# Patient Record
Sex: MALE | Race: WHITE | URBAN - METROPOLITAN AREA
[De-identification: names, ages, dates, MRNs, and addresses within clinical notes are randomized per-mention and may not be internally consistent; named-entity substitution may affect disease eponyms.]

---

## 2018-09-22 ENCOUNTER — ED HISTORICAL/CONVERTED ENCOUNTER (OUTPATIENT)
Dept: OTHER | Age: 43
End: 2018-09-22

## 2020-11-12 ENCOUNTER — APPOINTMENT (OUTPATIENT)
Dept: GENERAL RADIOLOGY | Age: 45
DRG: 871 | End: 2020-11-12
Attending: EMERGENCY MEDICINE
Payer: COMMERCIAL

## 2020-11-12 ENCOUNTER — HOSPITAL ENCOUNTER (INPATIENT)
Age: 45
LOS: 7 days | Discharge: HOME | DRG: 871 | End: 2020-11-20
Attending: EMERGENCY MEDICINE | Admitting: INTERNAL MEDICINE
Payer: COMMERCIAL

## 2020-11-12 ENCOUNTER — APPOINTMENT (OUTPATIENT)
Dept: CT IMAGING | Age: 45
DRG: 871 | End: 2020-11-12
Attending: EMERGENCY MEDICINE
Payer: COMMERCIAL

## 2020-11-12 DIAGNOSIS — U07.1 PNEUMONIA DUE TO COVID-19 VIRUS: Primary | ICD-10-CM

## 2020-11-12 DIAGNOSIS — R19.7 DIARRHEA, UNSPECIFIED TYPE: ICD-10-CM

## 2020-11-12 DIAGNOSIS — R65.10 SIRS (SYSTEMIC INFLAMMATORY RESPONSE SYNDROME) (HCC): ICD-10-CM

## 2020-11-12 DIAGNOSIS — A41.9 SEPSIS WITH ACUTE ORGAN DYSFUNCTION, DUE TO UNSPECIFIED ORGANISM, UNSPECIFIED TYPE, UNSPECIFIED WHETHER SEPTIC SHOCK PRESENT (HCC): ICD-10-CM

## 2020-11-12 DIAGNOSIS — R65.20 SEPSIS WITH ACUTE ORGAN DYSFUNCTION, DUE TO UNSPECIFIED ORGANISM, UNSPECIFIED TYPE, UNSPECIFIED WHETHER SEPTIC SHOCK PRESENT (HCC): ICD-10-CM

## 2020-11-12 DIAGNOSIS — R07.9 CHEST PAIN, UNSPECIFIED TYPE: ICD-10-CM

## 2020-11-12 DIAGNOSIS — K22.89 ESOPHAGEAL MASS: ICD-10-CM

## 2020-11-12 DIAGNOSIS — J12.82 PNEUMONIA DUE TO COVID-19 VIRUS: Primary | ICD-10-CM

## 2020-11-12 LAB
ALBUMIN SERPL-MCNC: 2.7 G/DL (ref 3.5–5)
ALBUMIN/GLOB SERPL: 0.6 {RATIO} (ref 1.1–2.2)
ALP SERPL-CCNC: 111 U/L (ref 45–117)
ALT SERPL-CCNC: 25 U/L (ref 12–78)
ANION GAP SERPL CALC-SCNC: 13 MMOL/L (ref 5–15)
AST SERPL W P-5'-P-CCNC: 28 U/L (ref 15–37)
BASOPHILS # BLD: 0 K/UL (ref 0–0.1)
BASOPHILS NFR BLD: 0 % (ref 0–1)
BILIRUB SERPL-MCNC: 0.5 MG/DL (ref 0.2–1)
BUN SERPL-MCNC: 22 MG/DL (ref 6–20)
BUN/CREAT SERPL: 15 (ref 12–20)
CA-I BLD-MCNC: 8.7 MG/DL (ref 8.5–10.1)
CHLORIDE SERPL-SCNC: 95 MMOL/L (ref 97–108)
CO2 SERPL-SCNC: 23 MMOL/L (ref 21–32)
CREAT SERPL-MCNC: 1.48 MG/DL (ref 0.7–1.3)
DIFFERENTIAL METHOD BLD: ABNORMAL
EOSINOPHIL # BLD: 0 K/UL (ref 0–0.4)
EOSINOPHIL NFR BLD: 0 % (ref 0–7)
ERYTHROCYTE [DISTWIDTH] IN BLOOD BY AUTOMATED COUNT: 13.3 % (ref 11.5–14.5)
GLOBULIN SER CALC-MCNC: 4.9 G/DL (ref 2–4)
GLUCOSE SERPL-MCNC: 338 MG/DL (ref 65–100)
HCT VFR BLD AUTO: 46.5 % (ref 36.6–50.3)
HGB BLD-MCNC: 16.2 G/DL (ref 12.1–17)
IMM GRANULOCYTES # BLD AUTO: 0.5 K/UL (ref 0–0.04)
IMM GRANULOCYTES NFR BLD AUTO: 2 % (ref 0–0.5)
LACTATE SERPL-SCNC: 6.3 MMOL/L (ref 0.4–2)
LYMPHOCYTES # BLD: 1.3 K/UL (ref 0.8–3.5)
LYMPHOCYTES NFR BLD: 5 % (ref 12–49)
MCH RBC QN AUTO: 31 PG (ref 26–34)
MCHC RBC AUTO-ENTMCNC: 34.8 G/DL (ref 30–36.5)
MCV RBC AUTO: 89.1 FL (ref 80–99)
MONOCYTES # BLD: 1.1 K/UL (ref 0–1)
MONOCYTES NFR BLD: 4 % (ref 5–13)
NEUTS SEG # BLD: 26.1 K/UL (ref 1.8–8)
NEUTS SEG NFR BLD: 92 % (ref 32–75)
PLATELET # BLD AUTO: 112 K/UL (ref 150–400)
PMV BLD AUTO: 11.3 FL (ref 8.9–12.9)
POTASSIUM SERPL-SCNC: 3.4 MMOL/L (ref 3.5–5.1)
PROT SERPL-MCNC: 7.6 G/DL (ref 6.4–8.2)
RBC # BLD AUTO: 5.22 M/UL (ref 4.1–5.7)
SODIUM SERPL-SCNC: 131 MMOL/L (ref 136–145)
TROPONIN I SERPL-MCNC: <0.05 NG/ML
WBC # BLD AUTO: 28.4 K/UL (ref 4.1–11.1)

## 2020-11-12 PROCEDURE — 71250 CT THORAX DX C-: CPT

## 2020-11-12 PROCEDURE — 80053 COMPREHEN METABOLIC PANEL: CPT

## 2020-11-12 PROCEDURE — 83605 ASSAY OF LACTIC ACID: CPT

## 2020-11-12 PROCEDURE — 36415 COLL VENOUS BLD VENIPUNCTURE: CPT

## 2020-11-12 PROCEDURE — 74011250636 HC RX REV CODE- 250/636: Performed by: EMERGENCY MEDICINE

## 2020-11-12 PROCEDURE — 85025 COMPLETE CBC W/AUTO DIFF WBC: CPT

## 2020-11-12 PROCEDURE — 71045 X-RAY EXAM CHEST 1 VIEW: CPT

## 2020-11-12 PROCEDURE — 96361 HYDRATE IV INFUSION ADD-ON: CPT

## 2020-11-12 PROCEDURE — 93005 ELECTROCARDIOGRAM TRACING: CPT

## 2020-11-12 PROCEDURE — 99285 EMERGENCY DEPT VISIT HI MDM: CPT

## 2020-11-12 PROCEDURE — 87040 BLOOD CULTURE FOR BACTERIA: CPT

## 2020-11-12 PROCEDURE — 84484 ASSAY OF TROPONIN QUANT: CPT

## 2020-11-12 RX ORDER — SODIUM CHLORIDE 9 MG/ML
125 INJECTION, SOLUTION INTRAVENOUS CONTINUOUS
Status: DISCONTINUED | OUTPATIENT
Start: 2020-11-13 | End: 2020-11-17

## 2020-11-12 RX ORDER — AZITHROMYCIN 500 MG/1
500 TABLET, FILM COATED ORAL DAILY
Status: DISCONTINUED | OUTPATIENT
Start: 2020-11-13 | End: 2020-11-19

## 2020-11-12 RX ORDER — DEXAMETHASONE SODIUM PHOSPHATE 4 MG/ML
4 INJECTION, SOLUTION INTRA-ARTICULAR; INTRALESIONAL; INTRAMUSCULAR; INTRAVENOUS; SOFT TISSUE EVERY 6 HOURS
Status: DISCONTINUED | OUTPATIENT
Start: 2020-11-13 | End: 2020-11-13

## 2020-11-12 RX ADMIN — SODIUM CHLORIDE 1000 ML: 9 INJECTION, SOLUTION INTRAVENOUS at 23:07

## 2020-11-12 RX ADMIN — SODIUM CHLORIDE 1000 ML: 9 INJECTION, SOLUTION INTRAVENOUS at 22:11

## 2020-11-13 PROBLEM — J12.82 PNEUMONIA DUE TO COVID-19 VIRUS: Status: ACTIVE | Noted: 2020-11-13

## 2020-11-13 PROBLEM — U07.1 PNEUMONIA DUE TO COVID-19 VIRUS: Status: ACTIVE | Noted: 2020-11-13

## 2020-11-13 PROBLEM — A41.9 SEPSIS (HCC): Status: ACTIVE | Noted: 2020-11-13

## 2020-11-13 LAB
ATRIAL RATE: 129 BPM
CALCULATED P AXIS, ECG09: 46 DEGREES
CALCULATED R AXIS, ECG10: 28 DEGREES
CALCULATED T AXIS, ECG11: 47 DEGREES
DIAGNOSIS, 93000: NORMAL
GLUCOSE BLD STRIP.AUTO-MCNC: 342 MG/DL (ref 65–100)
GLUCOSE BLD STRIP.AUTO-MCNC: 358 MG/DL (ref 65–100)
GLUCOSE BLD STRIP.AUTO-MCNC: 368 MG/DL (ref 65–100)
LACTATE SERPL-SCNC: 2.8 MMOL/L (ref 0.4–2)
P-R INTERVAL, ECG05: 138 MS
PERFORMED BY, TECHID: ABNORMAL
Q-T INTERVAL, ECG07: 286 MS
QRS DURATION, ECG06: 82 MS
QTC CALCULATION (BEZET), ECG08: 418 MS
VENTRICULAR RATE, ECG03: 129 BPM

## 2020-11-13 PROCEDURE — 74011000258 HC RX REV CODE- 258: Performed by: INTERNAL MEDICINE

## 2020-11-13 PROCEDURE — 74011250636 HC RX REV CODE- 250/636: Performed by: EMERGENCY MEDICINE

## 2020-11-13 PROCEDURE — 74011250637 HC RX REV CODE- 250/637: Performed by: INTERNAL MEDICINE

## 2020-11-13 PROCEDURE — 87449 NOS EACH ORGANISM AG IA: CPT

## 2020-11-13 PROCEDURE — 83605 ASSAY OF LACTIC ACID: CPT

## 2020-11-13 PROCEDURE — 99221 1ST HOSP IP/OBS SF/LOW 40: CPT | Performed by: INTERNAL MEDICINE

## 2020-11-13 PROCEDURE — 74011000250 HC RX REV CODE- 250: Performed by: INTERNAL MEDICINE

## 2020-11-13 PROCEDURE — 87070 CULTURE OTHR SPECIMN AEROBIC: CPT

## 2020-11-13 PROCEDURE — 96374 THER/PROPH/DIAG INJ IV PUSH: CPT

## 2020-11-13 PROCEDURE — 74011250636 HC RX REV CODE- 250/636: Performed by: INTERNAL MEDICINE

## 2020-11-13 PROCEDURE — XW033E5 INTRODUCTION OF REMDESIVIR ANTI-INFECTIVE INTO PERIPHERAL VEIN, PERCUTANEOUS APPROACH, NEW TECHNOLOGY GROUP 5: ICD-10-PCS | Performed by: INTERNAL MEDICINE

## 2020-11-13 PROCEDURE — 36415 COLL VENOUS BLD VENIPUNCTURE: CPT

## 2020-11-13 PROCEDURE — 74011250637 HC RX REV CODE- 250/637: Performed by: EMERGENCY MEDICINE

## 2020-11-13 PROCEDURE — 74011636637 HC RX REV CODE- 636/637: Performed by: INTERNAL MEDICINE

## 2020-11-13 PROCEDURE — 82962 GLUCOSE BLOOD TEST: CPT

## 2020-11-13 PROCEDURE — 65270000029 HC RM PRIVATE

## 2020-11-13 RX ORDER — GUAIFENESIN/DEXTROMETHORPHAN 100-10MG/5
5 SYRUP ORAL
Status: DISCONTINUED | OUTPATIENT
Start: 2020-11-13 | End: 2020-11-20 | Stop reason: HOSPADM

## 2020-11-13 RX ORDER — ROSUVASTATIN CALCIUM 10 MG/1
1 TABLET, COATED ORAL DAILY
COMMUNITY
Start: 2019-01-15 | End: 2020-11-20

## 2020-11-13 RX ORDER — INSULIN GLARGINE 100 [IU]/ML
10 INJECTION, SOLUTION SUBCUTANEOUS DAILY
Status: DISCONTINUED | OUTPATIENT
Start: 2020-11-13 | End: 2020-11-13

## 2020-11-13 RX ORDER — ACETAMINOPHEN 650 MG/1
650 SUPPOSITORY RECTAL
Status: DISCONTINUED | OUTPATIENT
Start: 2020-11-13 | End: 2020-11-20 | Stop reason: HOSPADM

## 2020-11-13 RX ORDER — BISACODYL 5 MG
5 TABLET, DELAYED RELEASE (ENTERIC COATED) ORAL DAILY PRN
Status: DISCONTINUED | OUTPATIENT
Start: 2020-11-13 | End: 2020-11-20 | Stop reason: HOSPADM

## 2020-11-13 RX ORDER — MAGNESIUM SULFATE 100 %
4 CRYSTALS MISCELLANEOUS AS NEEDED
Status: DISCONTINUED | OUTPATIENT
Start: 2020-11-13 | End: 2020-11-17

## 2020-11-13 RX ORDER — ACETAMINOPHEN 325 MG/1
650 TABLET ORAL
Status: DISCONTINUED | OUTPATIENT
Start: 2020-11-13 | End: 2020-11-20 | Stop reason: HOSPADM

## 2020-11-13 RX ORDER — PROMETHAZINE HYDROCHLORIDE AND DEXTROMETHORPHAN HYDROBROMIDE 6.25; 15 MG/5ML; MG/5ML
5 SYRUP ORAL 3 TIMES DAILY
COMMUNITY
Start: 2020-11-11 | End: 2020-11-20

## 2020-11-13 RX ORDER — BENZONATATE 100 MG/1
100 CAPSULE ORAL
Status: DISCONTINUED | OUTPATIENT
Start: 2020-11-13 | End: 2020-11-20 | Stop reason: HOSPADM

## 2020-11-13 RX ORDER — INSULIN LISPRO 100 [IU]/ML
INJECTION, SOLUTION INTRAVENOUS; SUBCUTANEOUS
Status: DISCONTINUED | OUTPATIENT
Start: 2020-11-13 | End: 2020-11-15

## 2020-11-13 RX ORDER — DEXTROSE 50 % IN WATER (D50W) INTRAVENOUS SYRINGE
25-50 AS NEEDED
Status: DISCONTINUED | OUTPATIENT
Start: 2020-11-13 | End: 2020-11-17

## 2020-11-13 RX ORDER — DEXAMETHASONE SODIUM PHOSPHATE 4 MG/ML
6 INJECTION, SOLUTION INTRA-ARTICULAR; INTRALESIONAL; INTRAMUSCULAR; INTRAVENOUS; SOFT TISSUE EVERY 6 HOURS
Status: DISCONTINUED | OUTPATIENT
Start: 2020-11-13 | End: 2020-11-19

## 2020-11-13 RX ORDER — SODIUM CHLORIDE 0.9 % (FLUSH) 0.9 %
5-40 SYRINGE (ML) INJECTION AS NEEDED
Status: DISCONTINUED | OUTPATIENT
Start: 2020-11-13 | End: 2020-11-20 | Stop reason: HOSPADM

## 2020-11-13 RX ORDER — ALBUTEROL SULFATE 90 UG/1
2 AEROSOL, METERED RESPIRATORY (INHALATION)
COMMUNITY
Start: 2020-11-11

## 2020-11-13 RX ORDER — SODIUM CHLORIDE 0.9 % (FLUSH) 0.9 %
5-40 SYRINGE (ML) INJECTION EVERY 8 HOURS
Status: DISCONTINUED | OUTPATIENT
Start: 2020-11-13 | End: 2020-11-20 | Stop reason: HOSPADM

## 2020-11-13 RX ORDER — ENOXAPARIN SODIUM 100 MG/ML
30 INJECTION SUBCUTANEOUS EVERY 12 HOURS
Status: DISCONTINUED | OUTPATIENT
Start: 2020-11-13 | End: 2020-11-20 | Stop reason: HOSPADM

## 2020-11-13 RX ORDER — INSULIN LISPRO 100 [IU]/ML
10 INJECTION, SOLUTION INTRAVENOUS; SUBCUTANEOUS ONCE
Status: COMPLETED | OUTPATIENT
Start: 2020-11-13 | End: 2020-11-13

## 2020-11-13 RX ORDER — MELATONIN
2000 DAILY
Status: DISCONTINUED | OUTPATIENT
Start: 2020-11-13 | End: 2020-11-20 | Stop reason: HOSPADM

## 2020-11-13 RX ORDER — INSULIN GLARGINE 100 [IU]/ML
20 INJECTION, SOLUTION SUBCUTANEOUS DAILY
Status: DISCONTINUED | OUTPATIENT
Start: 2020-11-14 | End: 2020-11-15

## 2020-11-13 RX ADMIN — INSULIN LISPRO 10 UNITS: 100 INJECTION, SOLUTION INTRAVENOUS; SUBCUTANEOUS at 12:56

## 2020-11-13 RX ADMIN — METHYLPREDNISOLONE SODIUM SUCCINATE 40 MG: 40 INJECTION, POWDER, FOR SOLUTION INTRAMUSCULAR; INTRAVENOUS at 09:47

## 2020-11-13 RX ADMIN — ACETAMINOPHEN 650 MG: 325 TABLET, FILM COATED ORAL at 23:43

## 2020-11-13 RX ADMIN — INSULIN LISPRO 7 UNITS: 100 INJECTION, SOLUTION INTRAVENOUS; SUBCUTANEOUS at 23:43

## 2020-11-13 RX ADMIN — PIPERACILLIN SODIUM AND TAZOBACTAM SODIUM 3.38 G: 3; .375 INJECTION, POWDER, LYOPHILIZED, FOR SOLUTION INTRAVENOUS at 23:42

## 2020-11-13 RX ADMIN — INSULIN GLARGINE 10 UNITS: 100 INJECTION, SOLUTION SUBCUTANEOUS at 09:47

## 2020-11-13 RX ADMIN — SODIUM CHLORIDE 200 MG: 9 INJECTION, SOLUTION INTRAVENOUS at 12:56

## 2020-11-13 RX ADMIN — SODIUM CHLORIDE 125 ML/HR: 9 INJECTION, SOLUTION INTRAVENOUS at 06:09

## 2020-11-13 RX ADMIN — ENOXAPARIN SODIUM 30 MG: 100 INJECTION SUBCUTANEOUS at 09:47

## 2020-11-13 RX ADMIN — Medication 10 ML: at 22:00

## 2020-11-13 RX ADMIN — BENZONATATE 100 MG: 100 CAPSULE ORAL at 23:43

## 2020-11-13 RX ADMIN — AZITHROMYCIN MONOHYDRATE 500 MG: 500 TABLET ORAL at 00:00

## 2020-11-13 RX ADMIN — ENOXAPARIN SODIUM 30 MG: 100 INJECTION SUBCUTANEOUS at 23:44

## 2020-11-13 RX ADMIN — DEXAMETHASONE SODIUM PHOSPHATE 4 MG: 4 INJECTION, SOLUTION INTRA-ARTICULAR; INTRALESIONAL; INTRAMUSCULAR; INTRAVENOUS; SOFT TISSUE at 00:00

## 2020-11-13 RX ADMIN — Medication 10 ML: at 09:43

## 2020-11-13 RX ADMIN — SODIUM CHLORIDE 1000 ML: 9 INJECTION, SOLUTION INTRAVENOUS at 00:00

## 2020-11-13 RX ADMIN — PIPERACILLIN SODIUM AND TAZOBACTAM SODIUM 3.38 G: 3; .375 INJECTION, POWDER, LYOPHILIZED, FOR SOLUTION INTRAVENOUS at 09:42

## 2020-11-13 RX ADMIN — DEXAMETHASONE SODIUM PHOSPHATE 6 MG: 4 INJECTION, SOLUTION INTRA-ARTICULAR; INTRALESIONAL; INTRAMUSCULAR; INTRAVENOUS; SOFT TISSUE at 23:42

## 2020-11-13 NOTE — CONSULTS
Pulmonary/ CC Consult    Subjective:   Date of Consultation:  November 13, 2020  Referring Cortney Moore MD  Patient is an obese  39 y.o. male who is being seen COVID-19 infection and bilateral pneumonia. Patient noted that around 3 November he developed symptoms of cough shortness of breath and body aches. He was evaluated at [facility], treated for bronchitis. His COVID-19 test came back positive. He further noted that his girlfriend also picked up infection. Her symptoms are mild. Patient has complaints of headaches, diarrhea, body aches, dry cough and bouts. He has been running fever. He has been taking Tylenol for his fever. His symptoms progressively got worse and he became tachypneic and came to the emergency department. Yesterday he was evaluated at an ER facility, was given steroids and antibiotics and was discharged home but his chest tightness did not improve and therefore he came back to ER today. He is noted to have elevated WBC count of 28K  Patient looks anxious. His oxygen saturation is 94% on room air. He denies any history of smoking or alcohol use. He works as a supervisor at [employer]. Patient Active Problem List   Diagnosis Code    Pneumonia due to COVID-19 virus U07.1, J12.89    Sepsis ([facility] [address] A41.9     Past Medical History:   Diagnosis Date    COVID-19       History reviewed. No pertinent family history. 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 Prior to Admission medications    Medication Sig Start Date End Date Taking? Authorizing Provider   rosuvastatin (CRESTOR) 10 mg tablet Take 1 Tab by mouth daily.  1/15/19  Yes Provider, Historical   albuterol (PROVENTIL HFA, VENTOLIN HFA, PROAIR HFA) 90 mcg/actuation inhaler Take 2 Puffs by inhalation four (4) times daily as needed. 20   Provider, Historical   aspirin-calcium carbonate 81 mg-300 mg calcium(777 mg) tab Take 81 mg by mouth daily. Provider, Historical   promethazine-dextromethorphan (PROMETHAZINE-DM) 6.25-15 mg/5 mL syrup Take 5 mL by mouth three (3) times daily. 20   Provider, Historical     No Known Allergies     Review of Systems: All 10 systems were reviewed. Positive pertinent findings are mentioned above  He noted that he had cardiac catheterization done in the past which did not show any significant coronary artery stenosis. He also complains of occasional epigastric discomfort. Objective:   Blood pressure 129/74, pulse (!) 112, temperature 99 °F (37.2 °C), resp. rate 26, height 5' 10\" (1.778 m), weight 127 kg (280 lb), SpO2 97 %. Temp (24hrs), Av.4 °F (36.9 °C), Min:97.9 °F (36.6 °C), Max:99 °F (37.2 °C)    XR CHEST SNGL V   Final Result   Addendum 1 of 1   Addendum: Addendum/   impression:      Infrahilar disease on the right corresponds to mass seen on CT exam    reported   separately. Please see that report for further assessment pulmonary    findings. Final      CT CHEST WO CONT   Final Result   Impression:      1. 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 Follow-up   contrast enhanced CT of the chest recommended. 2.  Patchy bilateral airspace disease in both lungs consistent with reported   history of atypical viral infection/COVID pneumonia. 3.  Diffusely thickened esophageal wall of uncertain etiology and significance. 4.  Coronary atherosclerosis. 5.  Additional findings as above.          Data Review:   Current Facility-Administered Medications   Medication Dose Route Frequency    piperacillin-tazobactam (ZOSYN) 3.375 g in 0.9% sodium chloride (MBP/ADV) 100 mL MBP  3.375 g IntraVENous Q8H    enoxaparin (LOVENOX) injection 30 mg  30 mg SubCUTAneous Q12H    acetaminophen (TYLENOL) tablet 650 mg  650 mg Oral Q6H PRN    Or    acetaminophen (TYLENOL) suppository 650 mg  650 mg Rectal Q6H PRN    cholecalciferol (VITAMIN D3) (1000 Units /25 mcg) tablet 2 Tab  2,000 Units Oral DAILY    guaiFENesin-dextromethorphan (ROBITUSSIN DM) 100-10 mg/5 mL syrup 5 mL  5 mL Oral Q4H PRN    methylPREDNISolone (PF) (SOLU-MEDROL) injection 40 mg  40 mg IntraVENous Q12H    sodium chloride (NS) flush 5-40 mL  5-40 mL IntraVENous Q8H    sodium chloride (NS) flush 5-40 mL  5-40 mL IntraVENous PRN    bisacodyL (DULCOLAX) tablet 5 mg  5 mg Oral DAILY PRN    glucose chewable tablet 16 g  4 Tab Oral PRN    dextrose (D50W) injection syrg 12.5-25 g  25-50 mL IntraVENous PRN    glucagon (GLUCAGEN) injection 1 mg  1 mg IntraMUSCular PRN    insulin glargine (LANTUS) injection 10 Units  10 Units SubCUTAneous DAILY    insulin lispro (HUMALOG) injection   SubCUTAneous TIDAC    dexamethasone (DECADRON) 4 mg/mL injection 4 mg  4 mg IntraVENous Q6H    azithromycin (ZITHROMAX) tablet 500 mg  500 mg Oral DAILY    0.9% sodium chloride infusion  125 mL/hr IntraVENous CONTINUOUS     Current Outpatient Medications   Medication Sig    rosuvastatin (CRESTOR) 10 mg tablet Take 1 Tab by mouth daily.  albuterol (PROVENTIL HFA, VENTOLIN HFA, PROAIR HFA) 90 mcg/actuation inhaler Take 2 Puffs by inhalation four (4) times daily as needed.  aspirin-calcium carbonate 81 mg-300 mg calcium(777 mg) tab Take 81 mg by mouth daily.  promethazine-dextromethorphan (PROMETHAZINE-DM) 6.25-15 mg/5 mL syrup Take 5 mL by mouth three (3) times daily. Exam: This is a middle aged male who looks older than his stated age. He is obese. Currently he is looking anxious. He is alert and oriented x3. Pupils are round responsive to light sclera anicteric conjunctive are pink. Neck is supple. No cervical lymphadenopathy. Chest: Bilateral rhonchi audible.   No wheezing was appreciated  Heart: S1-S2 normal  Abdomen: Soft, nontender, no visceromegaly  Extremities: No edema, cyanosis or clubbing  Neuro: No focal motor deficit. Psychiatric: No evidence of depression. Normal affect except looking anxious    Recent Results (from the past 24 hour(s))   EKG, 12 LEAD, INITIAL    Collection Time: 11/12/20  9:58 PM   Result Value Ref Range    Ventricular Rate 129 BPM    Atrial Rate 129 BPM    P-R Interval 138 ms    QRS Duration 82 ms    Q-T Interval 286 ms    QTC Calculation (Bezet) 418 ms    Calculated P Axis 46 degrees    Calculated R Axis 28 degrees    Calculated T Axis 47 degrees    Diagnosis       Sinus tachycardia  Nonspecific T wave abnormality  Abnormal ECG  When compared with ECG of 22-SEP-2018 12:38,  Vent. rate has increased BY  53 BPM  Confirmed by Kaylan Park ([id number]) on 11/13/2020 6:56:37 AM     CBC WITH AUTOMATED DIFF    Collection Time: 11/12/20 10:00 PM   Result Value Ref Range    WBC 28.4 (H) 4.1 - 11.1 K/uL    RBC 5.22 4.10 - 5.70 M/uL    HGB 16.2 12.1 - 17.0 g/dL    HCT 46.5 36.6 - 50.3 %    MCV 89.1 80.0 - 99.0 FL    MCH 31.0 26.0 - 34.0 PG    MCHC 34.8 30.0 - 36.5 g/dL    RDW 13.3 11.5 - 14.5 %    PLATELET 896 (L) 655 - 400 K/uL    MPV 11.3 8.9 - 12.9 FL    NEUTROPHILS 92 (H) 32 - 75 %    LYMPHOCYTES 5 (L) 12 - 49 %    MONOCYTES 4 (L) 5 - 13 %    EOSINOPHILS 0 0 - 7 %    BASOPHILS 0 0 - 1 %    IMMATURE GRANULOCYTES 2 (H) 0.0 - 0.5 %    ABS. NEUTROPHILS 26.1 (H) 1.8 - 8.0 K/UL    ABS. LYMPHOCYTES 1.3 0.8 - 3.5 K/UL    ABS. MONOCYTES 1.1 (H) 0.0 - 1.0 K/UL    ABS. EOSINOPHILS 0.0 0.0 - 0.4 K/UL    ABS. BASOPHILS 0.0 0.0 - 0.1 K/UL    ABS. IMM.  GRANS. 0.5 (H) 0.00 - 0.04 K/UL    DF AUTOMATED     METABOLIC PANEL, COMPREHENSIVE    Collection Time: 11/12/20 10:00 PM   Result Value Ref Range    Sodium 131 (L) 136 - 145 mmol/L    Potassium 3.4 (L) 3.5 - 5.1 mmol/L    Chloride 95 (L) 97 - 108 mmol/L    CO2 23 21 - 32 mmol/L    Anion gap 13 5 - 15 mmol/L    Glucose 338 (H) 65 - 100 mg/dL    BUN 22 (H) 6 - 20 mg/dL    Creatinine 1.48 (H) 0.70 - 1.30 mg/dL    BUN/Creatinine ratio 15 12 - 20      GFR est AA >60 >60 ml/min/1.73m2    GFR est non-AA 51 (L) >60 ml/min/1.73m2    Calcium 8.7 8.5 - 10.1 mg/dL    Bilirubin, total 0.5 0.2 - 1.0 mg/dL    AST (SGOT) 28 15 - 37 U/L    ALT (SGPT) 25 12 - 78 U/L    Alk. phosphatase 111 45 - 117 U/L    Protein, total 7.6 6.4 - 8.2 g/dL    Albumin 2.7 (L) 3.5 - 5.0 g/dL    Globulin 4.9 (H) 2.0 - 4.0 g/dL    A-G Ratio 0.6 (L) 1.1 - 2.2     TROPONIN I    Collection Time: 11/12/20 10:00 PM   Result Value Ref Range    Troponin-I, Qt. <0.05 <0.05 ng/mL   CULTURE, BLOOD, PAIRED    Collection Time: 11/12/20 10:00 PM    Specimen: Blood   Result Value Ref Range    Special Requests: No Special Requests      Culture result: No growth after 11 hours     LACTIC ACID    Collection Time: 11/12/20 10:00 PM   Result Value Ref Range    Lactic acid 6.3 (HH) 0.4 - 2.0 mmol/L   LACTIC ACID    Collection Time: 11/13/20  2:00 AM   Result Value Ref Range    Lactic acid 2.8 (HH) 0.4 - 2.0 mmol/L   GLUCOSE, POC    Collection Time: 11/13/20 11:48 AM   Result Value Ref Range    Glucose (POC) 368 (H) 65 - 100 mg/dL    Performed by Tere Salinas        Impression: This is an obese  39 y.o. male who is being seen COVID-19 infection and bilateral pneumonia. Patient noted that around 3 November he developed symptoms of cough shortness of breath and body aches. He was evaluated at [facility], treated for bronchitis. His COVID-19 test came back positive. He further noted that his girlfriend also picked up infection. Her symptoms are mild. Patient has complaints of headaches, diarrhea, body aches, dry cough and bouts. He has been running fever. He has been taking Tylenol for his fever. His symptoms progressively got worse and he became tachypneic and came to the emergency department.     Yesterday he was evaluated at an ER facility, was given steroids and antibiotics and was discharged home but his chest tightness did not improve and therefore he came back to ER today. He is noted to have elevated WBC count of 28K. He is noted to have elevated BUN and creatinine      Plan:   1. Sepsis:  Patient has developed sepsis from COVID-19 infection. His WBC count is elevated, lactic acid level 6.2. He is volume contracted. We will start him on IV fluids, repeat his lactic acid level. 2.  COVID-19 infection:  He has been confirmed to have COVID-19 infection. His CT scan of chest has shown bilateral scattered infiltrates. He is also noted to have paraesophageal mass  Patient will get started on remdesivir 200 mg first day and then 100 mg every day for 5 days  He will get started on Actemra. We will check his D-dimer level, if it is high then he will get started on therapeutic dose of Lovenox because of high evidence of hypercoagulability from COVID-19 infection  Patient also was started on IV Zosyn   I will adjust his dexamethasone dose to 6 mg once daily  3. 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 4.  Elevated creatinine: This seems to be secondary to dehydration. This will improve after his IV hydration. We will repeat basic metabolic profile tomorrow. We will hold his statins for now. I thank you for involving me in the management of your patient  Total of 55 minutes were spent in patient's evaluation, review of lab data, imaging studies and decision making. This documentation was prepared using voice recognition software. Typographical errors may occur.   Attempt has been made to fix those errors however inadvertent errors may persist    Vipin Sanchez MD  Pulmonary/CC

## 2020-11-13 NOTE — ED NOTES
Pt dx with covid on 11/5/2020. Coming in with SOB and mid sternal chest pain. Pt tachycardic and non febrile. Denies health history. Otherwise, hemodynamically stable. Placed on constant monitoring, IV placed and pending.

## 2020-11-13 NOTE — ED TRIAGE NOTES
Pt states was recently dx with covid 2 days ago and went to [facility] yesterday and they gave steroids and cough and dc pt still having chest tightness

## 2020-11-13 NOTE — ED PROVIDER NOTES
Patient:   CLINT SOUSA            MRN: [id number]            FIN: [id number]              EMERGENCY DEPARTMENT HISTORY AND PHYSICAL EXAM      Date: 11/12/2020  Patient Name: Theresa Lima      History of Presenting Illness     Chief Complaint   Patient presents with    Chest Pain       History Provided By: Patient    HPI: Theresa Lima, 39 y.o. male with a past medical history significant No significant past medical history presents to the ED with cc of COVID+, Chest tightness, SOB. Patient went to outside hospital yesterday received steroids and antibiotics and was discharge. He returns tonight because of the persistent chest tightness. He notes that he has had severe indigestion symptoms, nauseated and difficulty eating. His shortness of breath is also worse. However he complains more of indigestion type symptoms in his lower chest region upper abdomen. He was diagnosed with Covid on 11/5. There are no other complaints, changes, or physical findings at this time. 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 Family History:  History reviewed. No pertinent family history. Social History:  Social History     Tobacco Use    Smoking status: Never Smoker    Smokeless tobacco: Never Used   Substance Use Topics    Alcohol use: Never     Frequency: Never    Drug use: Never       Allergies:  No Known Allergies      Review of Systems     Review of Systems   Constitutional: Negative for chills and fever. HENT: Negative. Negative for congestion, rhinorrhea, sneezing and sore throat. Eyes: Negative. Negative for redness and visual disturbance. Respiratory: Positive for cough, chest tightness and shortness of breath. Negative for wheezing. Cardiovascular: Positive for chest pain. Negative for leg swelling. Gastrointestinal: Negative.   Negative for abdominal pain, diarrhea, nausea and vomiting. Genitourinary: Negative. Negative for difficulty urinating and frequency. Musculoskeletal: Negative. Negative for arthralgias, back pain, myalgias and neck stiffness. Skin: Negative. Negative for color change and rash. Neurological: Negative. Negative for dizziness, syncope, weakness, numbness and headaches. Psychiatric/Behavioral: Negative. All other systems reviewed and are negative. Physical Exam     Physical Exam  Vitals signs and nursing note reviewed. HENT:      Head: Atraumatic. Cardiovascular:      Rate and Rhythm: Regular rhythm. Tachycardia present. Heart sounds: Normal heart sounds. No murmur. No friction rub. No gallop. Pulmonary:      Effort: Pulmonary effort is normal. Tachypnea present. No respiratory distress. Breath sounds: Examination of the right-middle field reveals decreased breath sounds. Examination of the left-middle field reveals decreased breath sounds. Examination of the right-lower field reveals decreased breath sounds. Examination of the left-lower field reveals decreased breath sounds. Decreased breath sounds present. No wheezing or rales. Comments: Mild distress  Speaking in several word sentences  Chest:      Chest wall: No tenderness. Abdominal:      General: Bowel sounds are normal. There is no distension. Palpations: Abdomen is soft. There is no mass. Tenderness: There is no abdominal tenderness. There is no guarding or rebound. Musculoskeletal: Normal range of motion. General: No tenderness. Neurological:      Mental Status: He is alert and oriented to person, place, and time.          Lab and Diagnostic Study Results     Labs -     Recent Results (from the past 12 hour(s))   CBC WITH AUTOMATED DIFF    Collection Time: 11/12/20 10:00 PM   Result Value Ref Range    WBC 28.4 (H) 4.1 - 11.1 K/uL    RBC 5.22 4.10 - 5.70 M/uL    HGB 16.2 12.1 - 17.0 g/dL    HCT 46.5 36.6 - 50.3 % MCV 89.1 80.0 - 99.0 FL    MCH 31.0 26.0 - 34.0 PG    MCHC 34.8 30.0 - 36.5 g/dL    RDW 13.3 11.5 - 14.5 %    PLATELET 144 (L) 530 - 400 K/uL    MPV 11.3 8.9 - 12.9 FL    NEUTROPHILS 92 (H) 32 - 75 %    LYMPHOCYTES 5 (L) 12 - 49 %    MONOCYTES 4 (L) 5 - 13 %    EOSINOPHILS 0 0 - 7 %    BASOPHILS 0 0 - 1 %    IMMATURE GRANULOCYTES 2 (H) 0.0 - 0.5 %    ABS. NEUTROPHILS 26.1 (H) 1.8 - 8.0 K/UL    ABS. LYMPHOCYTES 1.3 0.8 - 3.5 K/UL    ABS. MONOCYTES 1.1 (H) 0.0 - 1.0 K/UL    ABS. EOSINOPHILS 0.0 0.0 - 0.4 K/UL    ABS. BASOPHILS 0.0 0.0 - 0.1 K/UL    ABS. IMM. GRANS. 0.5 (H) 0.00 - 0.04 K/UL    DF AUTOMATED     METABOLIC PANEL, COMPREHENSIVE    Collection Time: 11/12/20 10:00 PM   Result Value Ref Range    Sodium 131 (L) 136 - 145 mmol/L    Potassium 3.4 (L) 3.5 - 5.1 mmol/L    Chloride 95 (L) 97 - 108 mmol/L    CO2 23 21 - 32 mmol/L    Anion gap 13 5 - 15 mmol/L    Glucose 338 (H) 65 - 100 mg/dL    BUN 22 (H) 6 - 20 mg/dL    Creatinine 1.48 (H) 0.70 - 1.30 mg/dL    BUN/Creatinine ratio 15 12 - 20      GFR est AA >60 >60 ml/min/1.73m2    GFR est non-AA 51 (L) >60 ml/min/1.73m2    Calcium 8.7 8.5 - 10.1 mg/dL    Bilirubin, total 0.5 0.2 - 1.0 mg/dL    AST (SGOT) 28 15 - 37 U/L    ALT (SGPT) 25 12 - 78 U/L    Alk. phosphatase 111 45 - 117 U/L    Protein, total 7.6 6.4 - 8.2 g/dL    Albumin 2.7 (L) 3.5 - 5.0 g/dL    Globulin 4.9 (H) 2.0 - 4.0 g/dL    A-G Ratio 0.6 (L) 1.1 - 2.2     TROPONIN I    Collection Time: 11/12/20 10:00 PM   Result Value Ref Range    Troponin-I, Qt. <0.05 <0.05 ng/mL   LACTIC ACID    Collection Time: 11/12/20 10:00 PM   Result Value Ref Range    Lactic acid 6.3 (HH) 0.4 - 2.0 mmol/L       Radiologic Studies -   [unfilled]  CT Results  (Last 48 hours)               11/12/20 2225  CT CHEST WO CONT Final result    Impression:  Impression:       1. 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 Follow-up   contrast enhanced CT of the chest recommended.    2.  Patchy bilateral airspace disease in both lungs consistent with reported   history of atypical viral infection/COVID pneumonia. 3.  Diffusely thickened esophageal wall of uncertain etiology and significance. 4.  Coronary atherosclerosis. 5.  Additional findings as above. Narrative:  CT CHEST WO CONT     11/12/2020 10:25 PM ; SRM        Indication: 39years old; Male ; sob, CHEST TIGHTNESS, covid+ ; covid +       Technique: Axial CT imaging of the chest was performed according to standard   protocol. Multiplanar reconstructions are provided. One or more of the following   dose reduction techniques were used to: Automatic exposure control, adjustment   of the mA and/or kV according to patient size, use of the aortic reconstruction   techniques. Intravenous contrast: None       Dose reduction: All CT scans at this facility are performed using dose reduction   optimization techniques as appropriate to the performed exam including the   following: Automatic exposure control; adjustment of the mA and/or kV according   to patient size; or the use of reconstruction techniques. Comparison: September 22, 2018 chest x-ray;       Findings:       Lungs: Patchy bilateral upper lobe airspace disease with air bronchograms. More   confluent consolidation at the left lung base. There are some consolidated   regions at the right lung base as well along the margins of a more masslike   abnormality described below. Large airways: The visualized trachea and central bronchial tree are patent. No   endobronchial lesions are evident. Pleura: Unremarkable. Vessels: Atherosclerotic calcification of the aorta. Remaining vessels otherwise   unremarkable noncontrast assessment. Heart: Coronary atherosclerosis. The heart is normal in size and contour. No   pericardial effusion. Mediastinum and siddharth: The visualized thyroid is unremarkable. Pathologic   paratracheal and hilar adenopathy on the right.  There is a large masslike   abnormality which may be contiguous with the lower thoracic esophagus measuring   approximately 7.5 cm x 7.5 cm in cross-section and 8.5 cm cranial-caudal. There   is abnormal thickening of the esophageal wall in a fairly diffuse fashion from   just below the thoracic inlet through the gastroesophageal junction. Chest wall/soft tissues: Unremarkable. Upper abdomen: Pancreatic atrophy. Otherwise unremarkable. Bones: Multilevel thoracic degenerative findings. No erosive or destructive   changes. Lines and devices: There are no indwelling lines or devices identified. CXR Results  (Last 48 hours)    None          Medical Decision Making and ED Course   - I am the first and primary provider for this patient. - I reviewed the vital signs, available nursing notes, past medical history, past surgical history, family history and social history. - Initial assessment performed. The patients presenting problems have been discussed, and the staff are in agreement with the care plan formulated and outlined with them. I have encouraged them to ask questions as they arise throughout their visit. Vital Signs-Reviewed the patient's vital signs. Patient Vitals for the past 12 hrs:   Temp Pulse Resp BP SpO2   11/12/20 2232     94 %   11/12/20 2153 97.9 °F (36.6 °C) (!) 128 25 123/77 96 %       EKG interpretation: (Preliminary): Performed at 21:58, and read at 22:05  Rhythm: sinus tachycardia; and regular . Rate (approx.): 129; Axis: normal; ND interval: normal; QRS interval: normal ; ST/T wave: T wave inverted; Other findings: borderline ekg. Records Reviewed: Nursing Notes    The patient presents with chest pain with a differential diagnosis of  bronchitis, chest wall pain, costochondritis, dyspnea, pericarditis, pnuemothorax and pnuemonia, COVID  Pts complaint of indigestion nonspecific but could be related to steroids as it is new in past day or so.  Will expand workup to include labs, CT chest, IVF. Anticipate admission. ED Course:       Progress note: Updated patient on labs and imaging. Pt knows about esophageal mass and it is followed annually    Consultations:       Consultations:     CONSULT NOTE:   11:37 PM  Dr Sarah Pizano spoke with Dr Andressa Mcmahon  Specialty: Hospitalist  Discussed pt's hx, disposition, and available diagnostic and imaging results. Reviewed care plans. Consultant will evaluate pt for admission. Procedures and Critical Care         CRITICAL CARE NOTE :  10:02 PM  Amount of Critical Care Time: 62(minutes)    IMPENDING DETERIORATION -Respiratory, Cardiovascular and Metabolic  ASSOCIATED RISK FACTORS - Hypoxia, Dysrhythmia, Metabolic changes and Dehydration  MANAGEMENT- Bedside Assessment and Supervision of Care  INTERPRETATION -  Xrays, CT Scan, ECG and Blood Pressure  INTERVENTIONS - hemodynamic mngmt and Metobolic interventions  CASE REVIEW - Hospitalist  TREATMENT RESPONSE -Stable  PERFORMED BY - Self    NOTES   :  I have spent critical care time involved in lab review, consultations with specialist, family decision- making, bedside attention and documentation. This time excludes time spent in any separate billed procedures. During this entire length of time I was immediately available to the patient . Sam Loyd MD        Disposition     Disposition: Admitted to Floor Stepdown Unit the case was discussed with the admitting physician Dr Andressa Mcmahon. Diagnosis     Clinical Impression:   1. Pneumonia due to COVID-19 virus    2. Esophageal mass    3. SIRS (systemic inflammatory response syndrome) (HCC)        Attestations:    Sam Loyd MD    Please note that this dictation was completed with Sarb[employer], the computer voice recognition software. Quite often unanticipated grammatical, syntax, homophones, and other interpretive errors are inadvertently transcribed by the computer software. Please disregard these errors. Please excuse any errors that have escaped final proofreading. Thank you.

## 2020-11-13 NOTE — CONSULTS
Infectious Disease Consult Note    Reason for Consult:  COVID-19  Date of Consultation: November 13, 2020  Date of Admission: 11/12/2020  Referring Physician:  Dr Nita Siegel       HPI:  39 y.o WM who presented to the ED with chest pain and worsening dyspnea after testing positive for COVID-19 on 11/05. He mentions being seen at [facility] ED on 11/11 w c/o worsening symptoms but was discharged w a script for steroids and antibiotics. Pt reports ongoing symptoms which prompted his visit to [facility] ED on 11/12. He reported subjective fever/chills PTP but has been afebrile since presentation, with O2 sats in the low to mid 90s on RA. Initial Labs showed a leukocytosis of 28.4, lactic acid 6.3 and Cr 1.48. Noncontrast CT chest on 11/12 revealed a large paraesophageal mass, patchy bilateral airspace disease diffusely thickened esophageal wall of unclear significance. Blood Cx from 11/12 is pending. He is on Zosyn, azithromycin and Decadron. His medical history is significant for DM, HTN, obesity and CAD. Pt was seen in the ED awaiting transfer to the floors. 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 Allergies:  No Known Allergies      Medications:  No current facility-administered medications on file prior to encounter.       Current Outpatient Medications on File Prior to Encounter   Medication Sig Dispense Refill    rosuvastatin (CRESTOR) 10 mg tablet Take 1 Tab by mouth daily.  albuterol (PROVENTIL HFA, VENTOLIN HFA, PROAIR HFA) 90 mcg/actuation inhaler Take 2 Puffs by inhalation four (4) times daily as needed.  aspirin-calcium carbonate 81 mg-300 mg calcium(777 mg) tab Take 81 mg by mouth daily.  promethazine-dextromethorphan (PROMETHAZINE-DM) 6.25-15 mg/5 mL syrup Take 5 mL by mouth three (3) times daily. Physical Exam:    Vitals:   Patient Vitals for the past 24 hrs:   Temp Pulse Resp BP SpO2   11/13/20 1008  (!) 112 26 129/74 97 %   11/13/20 0612  (!) 117 28 102/75 95 %   11/13/20 0436 99 °F (37.2 °C) (!) 114 27 111/64 98 %   11/13/20 0257  (!) 119 28 111/78 95 %   11/12/20 2357 98.3 °F (36.8 °C) (!) 119  126/78 98 %   11/12/20 2232     94 %   11/12/20 2153 97.9 °F (36.6 °C) (!) 128 25 123/77 96 %   ·   · GEN: NAD, AAO x 4  · HEENT: NCAT, PERRLA  · HEART: S1, S2+, RRR, No murmur   · Lungs: Decreased at the bases, no wheeze/rhonchi   · Abdomen: soft, ND, NT, +BS   · Extremities: no edema  · Skin: no rash    Labs:   Recent Labs     11/12/20  2200   WBC 28.4*   HGB 16.2   HCT 46.5   *     Recent Labs     11/12/20  2200   BUN 22*   CREA 1.48*       Microbiology Data:  - Blood Cx 11/18: Pending      Imaging:   CT chest 11/12: 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 Follow-up contrast enhanced CT of the chest recommended. Patchy bilateral airspace disease in both lungs consistent with reported  history of atypical viral infection/COVID pneumonia. Diffusely thickened esophageal wall of uncertain etiology and significance. Assessment / Plan:   1. COVID-19 w pneumonitis: Tested positive as out pt on 11/05, reports worsening symptoms      Was on steroid as out pt prescribed by [facility] ED       Afebrile, sig leukocytosis on routine labs       Patchy b/l infiltrates on CT chest, maintaining O2 sats in the low to mid 90s on RA, + cough       Agree w Azithromycin, Zosyn and decadron.  Will add Remdesivir given ongoing dyspnea for over a wk       Add Ferritin, LDH and CRP to todays labs     2. Sepsis due to # 1. Sig Leukocytosis contributed by steroid therapy       Tachycardic but hemodynamically stable off pressors       Lactic acid of 6.3, trended down to 2.8      Continue antibiotics as above     3. Large paraesophageal mass: Incidental finding on CT chest      ? Malignancy, vs hernia, will need biopsy     4. Acute renal failure: Cr 1.48, baseline Cr unknown     5.  Non-productive cough: Likely due to # 1: Symptomatic Tx     Celester Artist, MD     11/13/2020

## 2020-11-13 NOTE — ED NOTES
Pt sitting on the side of bed resting head on side table. Nurse asked if she could do anything to make pt more comfortable. Pt states \"I am fine\" nurse gave pt a pillow.

## 2020-11-14 LAB
ANION GAP SERPL CALC-SCNC: 9 MMOL/L (ref 5–15)
APTT PPP: 55 SEC (ref 23–35.7)
BUN SERPL-MCNC: 20 MG/DL (ref 6–20)
BUN/CREAT SERPL: 26 (ref 12–20)
CA-I BLD-MCNC: 8.4 MG/DL (ref 8.5–10.1)
CHLORIDE SERPL-SCNC: 101 MMOL/L (ref 97–108)
CO2 SERPL-SCNC: 25 MMOL/L (ref 21–32)
CREAT SERPL-MCNC: 0.77 MG/DL (ref 0.7–1.3)
D DIMER PPP FEU-MCNC: 1.83 UG/ML(FEU)
FIBRINOGEN PPP-MCNC: NORMAL MG/DL (ref 220–535)
GLUCOSE BLD STRIP.AUTO-MCNC: 273 MG/DL (ref 65–100)
GLUCOSE BLD STRIP.AUTO-MCNC: 277 MG/DL (ref 65–100)
GLUCOSE BLD STRIP.AUTO-MCNC: 292 MG/DL (ref 65–100)
GLUCOSE BLD STRIP.AUTO-MCNC: 306 MG/DL (ref 65–100)
GLUCOSE BLD STRIP.AUTO-MCNC: 316 MG/DL (ref 65–100)
GLUCOSE SERPL-MCNC: 301 MG/DL (ref 65–100)
INR PPP: 1.1 (ref 0.9–1.1)
PERFORMED BY, TECHID: ABNORMAL
POTASSIUM SERPL-SCNC: 3.7 MMOL/L (ref 3.5–5.1)
PROTHROMBIN TIME: 14.7 SEC (ref 11.9–14.7)
SARS-COV-2, COV2: NORMAL
SODIUM SERPL-SCNC: 135 MMOL/L (ref 136–145)
THERAPEUTIC RANGE,PTTT: ABNORMAL SEC (ref 68–109)

## 2020-11-14 PROCEDURE — 85730 THROMBOPLASTIN TIME PARTIAL: CPT

## 2020-11-14 PROCEDURE — 85384 FIBRINOGEN ACTIVITY: CPT

## 2020-11-14 PROCEDURE — 74011250637 HC RX REV CODE- 250/637: Performed by: INTERNAL MEDICINE

## 2020-11-14 PROCEDURE — 80048 BASIC METABOLIC PNL TOTAL CA: CPT

## 2020-11-14 PROCEDURE — 36415 COLL VENOUS BLD VENIPUNCTURE: CPT

## 2020-11-14 PROCEDURE — 74011000250 HC RX REV CODE- 250: Performed by: INTERNAL MEDICINE

## 2020-11-14 PROCEDURE — 74011000258 HC RX REV CODE- 258: Performed by: INTERNAL MEDICINE

## 2020-11-14 PROCEDURE — 82962 GLUCOSE BLOOD TEST: CPT

## 2020-11-14 PROCEDURE — 74011250636 HC RX REV CODE- 250/636: Performed by: INTERNAL MEDICINE

## 2020-11-14 PROCEDURE — 99232 SBSQ HOSP IP/OBS MODERATE 35: CPT | Performed by: INTERNAL MEDICINE

## 2020-11-14 PROCEDURE — 85379 FIBRIN DEGRADATION QUANT: CPT

## 2020-11-14 PROCEDURE — 74011636637 HC RX REV CODE- 636/637: Performed by: INTERNAL MEDICINE

## 2020-11-14 PROCEDURE — 65270000029 HC RM PRIVATE

## 2020-11-14 PROCEDURE — 85610 PROTHROMBIN TIME: CPT

## 2020-11-14 PROCEDURE — 87635 SARS-COV-2 COVID-19 AMP PRB: CPT

## 2020-11-14 RX ADMIN — AZITHROMYCIN MONOHYDRATE 500 MG: 500 TABLET ORAL at 10:38

## 2020-11-14 RX ADMIN — INSULIN HUMAN 8 UNITS: 100 INJECTION, SOLUTION PARENTERAL at 22:51

## 2020-11-14 RX ADMIN — Medication 10 ML: at 13:44

## 2020-11-14 RX ADMIN — INSULIN GLARGINE 20 UNITS: 100 INJECTION, SOLUTION SUBCUTANEOUS at 10:39

## 2020-11-14 RX ADMIN — INSULIN LISPRO 5 UNITS: 100 INJECTION, SOLUTION INTRAVENOUS; SUBCUTANEOUS at 18:12

## 2020-11-14 RX ADMIN — Medication 2 TABLET: at 10:38

## 2020-11-14 RX ADMIN — INSULIN LISPRO 5 UNITS: 100 INJECTION, SOLUTION INTRAVENOUS; SUBCUTANEOUS at 10:39

## 2020-11-14 RX ADMIN — ENOXAPARIN SODIUM 30 MG: 100 INJECTION SUBCUTANEOUS at 10:38

## 2020-11-14 RX ADMIN — ENOXAPARIN SODIUM 30 MG: 100 INJECTION SUBCUTANEOUS at 22:51

## 2020-11-14 RX ADMIN — INSULIN LISPRO 7 UNITS: 100 INJECTION, SOLUTION INTRAVENOUS; SUBCUTANEOUS at 13:30

## 2020-11-14 RX ADMIN — DEXAMETHASONE SODIUM PHOSPHATE 6 MG: 4 INJECTION, SOLUTION INTRA-ARTICULAR; INTRALESIONAL; INTRAMUSCULAR; INTRAVENOUS; SOFT TISSUE at 10:38

## 2020-11-14 RX ADMIN — DEXAMETHASONE SODIUM PHOSPHATE 6 MG: 4 INJECTION, SOLUTION INTRA-ARTICULAR; INTRALESIONAL; INTRAMUSCULAR; INTRAVENOUS; SOFT TISSUE at 18:08

## 2020-11-14 RX ADMIN — SODIUM CHLORIDE 100 MG: 9 INJECTION, SOLUTION INTRAVENOUS at 13:30

## 2020-11-14 RX ADMIN — PIPERACILLIN SODIUM AND TAZOBACTAM SODIUM 3.38 G: 3; .375 INJECTION, POWDER, LYOPHILIZED, FOR SOLUTION INTRAVENOUS at 10:39

## 2020-11-14 RX ADMIN — PIPERACILLIN SODIUM AND TAZOBACTAM SODIUM 3.38 G: 3; .375 INJECTION, POWDER, LYOPHILIZED, FOR SOLUTION INTRAVENOUS at 18:12

## 2020-11-14 NOTE — ROUTINE PROCESS
Bedside shift change report given to Zena Montes (oncoming nurse) by Camila Mcbride (offgoing nurse). Report included the following information SBAR, Kardex, MAR and Recent Results.

## 2020-11-14 NOTE — PROGRESS NOTES
Infectious Disease Progress Note               Subjective:   Pt seen and examined at bedside. Subjectively better, still w chest tightness and non-productive cough   Objective:   Physical Exam:     Visit Vitals  /78   Pulse 97   Temp 98.3 °F (36.8 °C)   Resp 28   Ht 5' 10\" (1.778 m)   Wt 110 kg (242 lb 8.1 oz)   SpO2 94%   BMI 34.80 kg/m²    O2 Flow Rate (L/min): 2 l/min O2 Device: Room air    Temp (24hrs), Av.8 °F (37.1 °C), Min:98.3 °F (36.8 °C), Max:99.8 °F (37.7 °C)    No intake/output data recorded.  1901 -  0700  In: 1000 [I.V.:1000]  Out: -     General: NAD, AAO x 4  HEENT: GIDEON, Moist mucosa   Lungs: Decreased at the bases, no wheeze/rhonchi   Heart: S1S2+, RRR, no murmur  Abdo: Soft, NT, ND, +BS   Exts: No edema, + pulses b/l   Skin: No wounds, No rashes or lesions      Data Review:       Recent Days:  Recent Labs     20  2200   WBC 28.4*   HGB 16.2   HCT 46.5   *     Recent Labs     20  0602 20  2200   BUN 20 22*   CREA 0.77 1.48*       Microbiology   - Blood Cx : Pending     Diagnostics   CXR Results  (Last 48 hours)    None          Assessment/Plan     1. COVID-19 w pneumonitis: Tested positive as out pt on , suspected aspiration given paraesophageal mass, reported N/V       Patchy b/l infiltrates on CT chest      Remains on RA, cough less, afebrile       On day # 2 of Remdesivir, IV steroid, Azithromycin and Zosyn       Routine labs in the AM     2. Sepsis due to # 1. WBC of 28.4 on initial labs. Farida Gals labs are pending      Blood Cx from  is pending       Continue empiric Zosyn and Azithromycin      3. Large paraesophageal mass: Incidental finding on CT chest, pt states he wasn't aware of mass until current admit       ? Malignancy vs hernia, will need biopsy      Rec GI eval for endoscopic work up      4. Acute renal failure: Cr 1.48, trended down to 0.77 on todays labs     5.  Non-productive cough: Likely due to # 1: Symptomatic Tx     Alejandra Arcos MD    11/14/2020

## 2020-11-14 NOTE — CONSULTS
Hematology/Oncology Consult    Patient: Bolivar Woods MRN: [id number]     YOB: 1975  Age: 39 y.o. Sex: male      HPI      Bolivar Woods is a 39 y.o. male who is being seen for Esophageal mass. I did not examine the patient in his room. I did talk to him on the phone and also discussed his care with his nurse. Mr. Satish Adams was reportedly diagnosed with COVID-19 on 11/5 after had flu like symptoms. He presented to [facility] ER for 11/8 with worsening shortness of breath and N/V. He was discharged from ER with antiemetics. However, he reportedly had severe vomiting when he got home that night. He continued to have worsening shortness of breath for which he presented to the ER here. CT on admission showed patchy bilateral airspace disease. CT also showed a large paraesophageal mass in the lower thorax/mediastinum. He continues to have fatigue and exertional shortness of breath. He has difficultly swallowing only since the last few days. He deniea un-intentional weight loss. 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 History reviewed. No pertinent family history.   Social History     Tobacco Use    Smoking status: Never Smoker    Smokeless tobacco: Never Used   Substance Use Topics    Alcohol use: Never     Frequency: Never      Current Facility-Administered Medications   Medication Dose Route Frequency Provider Last Rate Last Dose    piperacillin-tazobactam (ZOSYN) 3.375 g in 0.9% sodium chloride (MBP/ADV) 100 mL MBP  3.375 g IntraVENous Q8H Reynold Thornton MD 25 mL/hr at 11/14/20 1039 3.375 g at 11/14/20 1039    enoxaparin (LOVENOX) injection 30 mg  30 mg SubCUTAneous Q12H April ERAZO MD   30 mg at 11/14/20 1038    acetaminophen (TYLENOL) tablet 650 mg  650 mg Oral Q6H PRN April ERAZO MD   650 mg at 11/13/20 2343    Or    acetaminophen (TYLENOL) suppository 650 mg  650 mg Rectal Q6H PRN Rufino Crockett MD        cholecalciferol (VITAMIN D3) (1000 Units /25 mcg) tablet 2 Tab  2,000 Units Oral DAILY Rufino Crockett MD   2 Tab at 11/14/20 1038    guaiFENesin-dextromethorphan (ROBITUSSIN DM) 100-10 mg/5 mL syrup 5 mL  5 mL Oral Q4H PRN Mohinder ERAZO MD        sodium chloride (NS) flush 5-40 mL  5-40 mL IntraVENous Q8H Rufino Crockett MD   10 mL at 11/14/20 1344    sodium chloride (NS) flush 5-40 mL  5-40 mL IntraVENous PRN Mohinder ERAZO MD        bisacodyL (DULCOLAX) tablet 5 mg  5 mg Oral DAILY PRN Mohinder ERAZO MD        glucose chewable tablet 16 g  4 Tab Oral PRN Mohinder ERAZO MD        dextrose (D50W) injection syrg 12.5-25 g  25-50 mL IntraVENous PRN Mohinder ERAZO MD        glucagon (GLUCAGEN) injection 1 mg  1 mg IntraMUSCular PRN Rufino Crockett MD        insulin lispro (HUMALOG) injection   SubCUTAneous TIDAC Mohinder ERAZO MD   7 Units at 11/14/20 1330    dexamethasone (DECADRON) 4 mg/mL injection 6 mg  6 mg IntraVENous Q6H Yuly Flores MD   Stopped at 11/14/20 1200    benzonatate (TESSALON) capsule 100 mg  100 mg Oral TID PRN Holly Thomason MD   100 mg at 11/13/20 2343    insulin glargine (LANTUS) injection 20 Units  20 Units SubCUTAneous DAILY Rufino Crockett MD   20 Units at 11/14/20 1039    remdesivir 100 mg in 0.9% sodium chloride 250 mL IVPB  100 mg IntraVENous Q24H Preet Reeves MD   100 mg at 11/14/20 1330    azithromycin (ZITHROMAX) tablet 500 mg  500 mg Oral DAILY Mohinder ERAZO MD   500 mg at 11/14/20 1038    0.9% sodium chloride infusion  125 mL/hr IntraVENous CONTINUOUS Mohinder ERAZO  mL/hr at 11/13/20 0609 125 mL/hr at 11/13/20 0609        No Known Allergies    Review of Systems:  Constitutional Fatgiue   Allergic/Immunologic No recent allergic reactions   Eyes No significant visual difficulties. No diplopia.    ENMT No problems with hearing, no sore throat, no sinus drainage. Endocrine No hot flashes or night sweats. No cold intolerance, polyuria, or polydipsia   Hematologic/Lymphatic No easy bruising or bleeding. The patient denies any tender or palpable lymph nodes   Breasts No abnormal masses of breast, nipple discharge or pain. Respiratory SOB +   Cardiovascular No anginal chest pain, irregular heart beat, tachycardia, palpitations or orthopnea. Gastrointestinal No nausea, vomiting, diarrhea, constipation, cramping, dysphagia, reflux, heartburn, GI bleeding, or early satiety. No change in bowel habits. Genitourinary (M) No hematuria, dysuria, increased frequency, urgency, hesitancy or incontinence. Musculoskeletal No joint pain, swelling or redness. No decreased range of motion. Integumentary No chronic rashes, inflammation, ulcerations, pruritus, petechiae, purpura, ecchymoses, or skin changes. Neurologic No headache, blurred vision, and no areas of focal weakness or numbness. Normal gait. No sensory problems. Psychiatric No insomnia, depression, ben or mood swings. No psychotropic drugs. Objective:     Vitals:    11/13/20 2326 11/14/20 0453 11/14/20 1033 11/14/20 1329   BP: 129/82 128/80 121/78    Pulse: (!) 110 96 96 97   Resp: 20 24 28    Temp: 98.5 °F (36.9 °C) 98.4 °F (36.9 °C) 98.9 °F (37.2 °C) 98.3 °F (36.8 °C)   SpO2: 94% 91% 92% 94%   Weight:       Height:            Physical Exam:  No distress   Able to talk and comprehend conversation    Lab/Data Review:  Recent Labs     11/12/20  2200   WBC 28.4*   HGB 16.2   HCT 46.5   *     Recent Labs     11/14/20  0602 11/12/20  2200   * 131*   K 3.7 3.4*    95*   CO2 25 23   * 338*   BUN 20 22*   CREA 0.77 1.48*   CA 8.4* 8.7   ALB  --  2.7*   TBILI  --  0.5   ALT  --  25   INR 1.1  --      No results for input(s): PH, PCO2, PO2, HCO3, FIO2 in the last 72 hours.   Recent Results (from the past 24 hour(s))   GLUCOSE, POC Collection Time: 11/13/20  6:15 PM   Result Value Ref Range    Glucose (POC) 342 (H) 65 - 100 mg/dL    Performed by [address], POC    Collection Time: 11/13/20  9:30 PM   Result Value Ref Range    Glucose (POC) 358 (H) 65 - 100 mg/dL    Performed by Sarah Ribeiro    SARS-COV-2    Collection Time: 11/14/20  2:15 AM   Result Value Ref Range    SARS-CoV-2 Please find results under separate order     GLUCOSE, POC    Collection Time: 11/14/20  5:02 AM   Result Value Ref Range    Glucose (POC) 273 (H) 65 - 100 mg/dL    Performed by Madhav Alva    PROTHROMBIN TIME + INR    Collection Time: 11/14/20  6:02 AM   Result Value Ref Range    Prothrombin time 14.7 11.9 - 14.7 sec    INR 1.1 0.9 - 1.1     PTT    Collection Time: 11/14/20  6:02 AM   Result Value Ref Range    aPTT 55.0 (H) 23.0 - 35.7 sec    aPTT, therapeutic range   68 - 109 sec   FIBRINOGEN    Collection Time: 11/14/20  6:02 AM   Result Value Ref Range    Fibrinogen Quantity not sufficient, suggest recollection 220 - 535 mg/dL   D DIMER    Collection Time: 11/14/20  6:02 AM   Result Value Ref Range    D DIMER 1.83 (H) <0.50 ug/ml(FEU)   METABOLIC PANEL, BASIC    Collection Time: 11/14/20  6:02 AM   Result Value Ref Range    Sodium 135 (L) 136 - 145 mmol/L    Potassium 3.7 3.5 - 5.1 mmol/L    Chloride 101 97 - 108 mmol/L    CO2 25 21 - 32 mmol/L    Anion gap 9 5 - 15 mmol/L    Glucose 301 (H) 65 - 100 mg/dL    BUN 20 6 - 20 mg/dL    Creatinine 0.77 0.70 - 1.30 mg/dL    BUN/Creatinine ratio 26 (H) 12 - 20      GFR est AA >60 >60 ml/min/1.73m2    GFR est non-AA >60 >60 ml/min/1.73m2    Calcium 8.4 (L) 8.5 - 10.1 mg/dL        Xr Chest Sngl V    Addendum Date: 11/12/2020    Addendum: Addendum/ impression: Infrahilar disease on the right corresponds to mass seen on CT exam reported separately. Please see that report for further assessment pulmonary findings.     Result Date: 11/12/2020  Impression: Patchy bilateral airspace disease suspicious for evolving pneumonia with differential considerations including atypical viral pneumonia in an appropriate clinical setting. Ct Chest Wo Cont    Result Date: 11/12/2020  Impression: 1. 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 Follow-up contrast enhanced CT of the chest recommended. 2.  Patchy bilateral airspace disease in both lungs consistent with reported history of atypical viral infection/COVID pneumonia. 3.  Diffusely thickened esophageal wall of uncertain etiology and significance. 4.  Coronary atherosclerosis. 5.  Additional findings as above. Assessment and Plan:     Hospital Problems  Never Reviewed          Codes Class Noted POA    Pneumonia due to COVID-19 virus ICD-10-CM: U07.1, J12.89  ICD-9-CM: 480.8  11/13/2020 Unknown        Sepsis ([facility] [address] ICD-10-CM: A41.9  ICD-9-CM: 038.9, 995.91  11/13/2020 Unknown            Paraesophageal Mass;  - Large lower thorax/mediastinal mass noted on CT.  - Differential includes lymphoma, primary esophageal.  - Will consult GI for possible EGD and biopsy for diagnosis. Leukocytosis:  - Likely due to COVID pneumonitis. - Management per primary team.    Thrombocytopenia:  - Mild. - Due to underlying infection.  - Re-check CBC in AM.    DVT Prophylaxis:  - Consider starting prophylactic anticoagulation after invasive procedures are completed. Thank you for the consult.       Signed By: Pincus Goldmann, MD     November 14, 2020

## 2020-11-14 NOTE — ROUTINE PROCESS
TRANSFER - OUT REPORT: 
 
Verbal report given to Navin Tejada RN (name) on Bolivar Woods  being transferred to [facility](unit) for routine progression of care Report consisted of patients Situation, Background, Assessment and  
Recommendations(SBAR). Information from the following report(s) SBAR was reviewed with the receiving nurse. Lines:  
Peripheral IV 11/13/20 Anterior;Right Forearm (Active) Site Assessment Clean, dry, & intact 11/13/20 0435 Phlebitis Assessment 0 11/13/20 0435 Infiltration Assessment 0 11/13/20 0435 Dressing Status Clean, dry, & intact 11/13/20 0435 Dressing Type Transparent 11/13/20 0103 Hub Color/Line Status Pink;Flushed;Patent 11/13/20 0435 Action Taken Other (comment) 11/13/20 4413 Opportunity for questions and clarification was provided. Patient transported with: 
 [employer]

## 2020-11-14 NOTE — PROGRESS NOTES
Progress Note    Patient: Ward Morales MRN: [id number]  SSN: [id number]    YOB: 1975  Age: 39 y.o. Sex: male      Admit Date: 11/12/2020    LOS: 1 day     Subjective:     66-year-old admitted for COVID-19 pneumonitis,Sepsis, large paraesophageal mass malignancy versus hiatal hernia, acute renal failure with complaint of cough. Objective:     Vitals:    11/13/20 2326 11/14/20 0453 11/14/20 1033 11/14/20 1329   BP: 129/82 128/80 121/78    Pulse: (!) 110 96 96 97   Resp: 20 24 28    Temp: 98.5 °F (36.9 °C) 98.4 °F (36.9 °C) 98.9 °F (37.2 °C) 98.3 °F (36.8 °C)   SpO2: 94% 91% 92% 94%   Weight:       Height:            Intake and Output:  Current Shift: No intake/output data recorded. Last three shifts: 11/12 1901 - 11/14 0700  In: 1000 [I.V.:1000]  Out: -     Physical Exam:   General:  Alert, cooperative, no distress, appears stated age. Eyes:  Conjunctivae/corneas clear. PERRL, EOMs intact. Fundi benign   Ears:  Normal TMs and external ear canals both ears. Nose: Nares normal. Septum midline. Mucosa normal. No drainage or sinus tenderness. Mouth/Throat: Lips, mucosa, and tongue normal. Teeth and gums normal.   Neck: Supple, symmetrical, trachea midline, no adenopathy, thyroid: no enlargment/tenderness/nodules, no carotid bruit and no JVD. Back:   Symmetric, no curvature. ROM normal. No CVA tenderness. Lungs:    AIR Entry bilaterally diminished   Heart:  Regular rate and rhythm, S1, S2 normal, no murmur, click, rub or gallop. Abdomen:   Soft, non-tender. Bowel sounds normal. No masses,  No organomegaly. Extremities: Extremities normal, atraumatic, no cyanosis or edema. Pulses: 2+ and symmetric all extremities. Skin: Skin color, texture, turgor normal. No rashes or lesions   Lymph nodes: Cervical, supraclavicular, and axillary nodes normal.   Neurologic: CNII-XII intact. Normal strength, sensation and reflexes throughout. Lab/Data Review:   All lab results for the last 24 hours reviewed.      Recent Results (from the past 24 hour(s))   GLUCOSE, POC    Collection Time: 11/13/20  6:15 PM   Result Value Ref Range    Glucose (POC) 342 (H) 65 - 100 mg/dL    Performed by [address], POC    Collection Time: 11/13/20  9:30 PM   Result Value Ref Range    Glucose (POC) 358 (H) 65 - 100 mg/dL    Performed by Sarah Ribeiro    SARS-COV-2    Collection Time: 11/14/20  2:15 AM   Result Value Ref Range    SARS-CoV-2 Please find results under separate order     GLUCOSE, POC    Collection Time: 11/14/20  5:02 AM   Result Value Ref Range    Glucose (POC) 273 (H) 65 - 100 mg/dL    Performed by Madhav Alva    PROTHROMBIN TIME + INR    Collection Time: 11/14/20  6:02 AM   Result Value Ref Range    Prothrombin time 14.7 11.9 - 14.7 sec    INR 1.1 0.9 - 1.1     PTT    Collection Time: 11/14/20  6:02 AM   Result Value Ref Range    aPTT 55.0 (H) 23.0 - 35.7 sec    aPTT, therapeutic range   68 - 109 sec   FIBRINOGEN    Collection Time: 11/14/20  6:02 AM   Result Value Ref Range    Fibrinogen Quantity not sufficient, suggest recollection 220 - 535 mg/dL   D DIMER    Collection Time: 11/14/20  6:02 AM   Result Value Ref Range    D DIMER 1.83 (H) <0.50 ug/ml(FEU)   METABOLIC PANEL, BASIC    Collection Time: 11/14/20  6:02 AM   Result Value Ref Range    Sodium 135 (L) 136 - 145 mmol/L    Potassium 3.7 3.5 - 5.1 mmol/L    Chloride 101 97 - 108 mmol/L    CO2 25 21 - 32 mmol/L    Anion gap 9 5 - 15 mmol/L    Glucose 301 (H) 65 - 100 mg/dL    BUN 20 6 - 20 mg/dL    Creatinine 0.77 0.70 - 1.30 mg/dL    BUN/Creatinine ratio 26 (H) 12 - 20      GFR est AA >60 >60 ml/min/1.73m2    GFR est non-AA >60 >60 ml/min/1.73m2    Calcium 8.4 (L) 8.5 - 10.1 mg/dL   GLUCOSE, POC    Collection Time: 11/14/20  8:40 AM   Result Value Ref Range    Glucose (POC) 292 (H) 65 - 100 mg/dL    Performed by Ron Castro    GLUCOSE, POC    Collection Time: 11/14/20 12:28 PM   Result Value Ref Range    Glucose (POC) 316 (H) 65 - 100 mg/dL    Performed by Paulina Boyle      Current Facility-Administered Medications   Medication Dose Route Frequency Provider Last Rate Last Dose    piperacillin-tazobactam (ZOSYN) 3.375 g in 0.9% sodium chloride (MBP/ADV) 100 mL MBP  3.375 g IntraVENous Q8H Manisha ERAZO MD 25 mL/hr at 11/14/20 1039 3.375 g at 11/14/20 1039    enoxaparin (LOVENOX) injection 30 mg  30 mg SubCUTAneous Q12H Manisha ERAZO MD   30 mg at 11/14/20 1038    acetaminophen (TYLENOL) tablet 650 mg  650 mg Oral Q6H PRN Manisha ERAZO MD   650 mg at 11/13/20 2343    Or    acetaminophen (TYLENOL) suppository 650 mg  650 mg Rectal Q6H PRN Demetria Hyde MD        cholecalciferol (VITAMIN D3) (1000 Units /25 mcg) tablet 2 Tab  2,000 Units Oral DAILY Demetria Hyde MD   2 Tab at 11/14/20 1038    guaiFENesin-dextromethorphan (ROBITUSSIN DM) 100-10 mg/5 mL syrup 5 mL  5 mL Oral Q4H PRN Manisha ERAZO MD        sodium chloride (NS) flush 5-40 mL  5-40 mL IntraVENous Q8H Manisha ERAZO MD   10 mL at 11/14/20 1344    sodium chloride (NS) flush 5-40 mL  5-40 mL IntraVENous PRN Manisha ERAZO MD        bisacodyL (DULCOLAX) tablet 5 mg  5 mg Oral DAILY PRN Manisha ERAZO MD        glucose chewable tablet 16 g  4 Tab Oral PRN Manisha ERAZO MD        dextrose (D50W) injection syrg 12.5-25 g  25-50 mL IntraVENous PRN Manisha ERAZO MD        glucagon (GLUCAGEN) injection 1 mg  1 mg IntraMUSCular PRN Demetria Hyde MD        insulin lispro (HUMALOG) injection   SubCUTAneous TIDAC Manisha ERAZO MD   7 Units at 11/14/20 1330    dexamethasone (DECADRON) 4 mg/mL injection 6 mg  6 mg IntraVENous Q6H Pal Coleman MD   Stopped at 11/14/20 1200    benzonatate (TESSALON) capsule 100 mg  100 mg Oral TID PRN Gerald Irwin MD   100 mg at 11/13/20 1123    insulin glargine (LANTUS) injection 20 Units  20 Units SubCUTAneous DAILY Demetria Hyde MD   20 Units at 11/14/20 1039    remdesivir 100 mg in 0.9% sodium chloride 250 mL IVPB  100 mg IntraVENous Q24H Preet Reeves MD   100 mg at 11/14/20 1330    azithromycin (ZITHROMAX) tablet 500 mg  500 mg Oral DAILY Tyshawn ERAZO MD   500 mg at 11/14/20 1038    0.9% sodium chloride infusion  125 mL/hr IntraVENous CONTINUOUS Tyshawn ERAZO  mL/hr at 11/13/20 0609 125 mL/hr at 11/13/20 0609         Assessment:     Active Problems:    Pneumonia due to COVID-19 virus (11/13/2020)      Sepsis ([facility] [address] (11/13/2020)    COVID-19 pneumonitis acute  Hyperglycemia  Acute hypoxic respiratory failure    Plan:   Continue IV antibiotics and steroids    Signed By: Jamal Tolentino MD     November 14, 2020

## 2020-11-14 NOTE — PROGRESS NOTES
Pulmonology and Critical Care Progress Note    Subjective:     Chief Complaint:   Chief Complaint   Patient presents with    Chest Pain          Patient seen and examined  Overnight as noted    Lying in bed comfortably  Awake and alert  On nasal cannula oxygen  Gradual improvement in respiratory status  No acute distress      Review of Systems:  A comprehensive review of systems was negative except for that written in the HPI.     Current Facility-Administered Medications   Medication Dose Route Frequency Provider Last Rate Last Dose    piperacillin-tazobactam (ZOSYN) 3.375 g in 0.9% sodium chloride (MBP/ADV) 100 mL MBP  3.375 g IntraVENous Q8H Yanique ERAZO MD 25 mL/hr at 11/14/20 1039 3.375 g at 11/14/20 1039    enoxaparin (LOVENOX) injection 30 mg  30 mg SubCUTAneous Q12H Yanique ERAZO MD   30 mg at 11/14/20 1038    acetaminophen (TYLENOL) tablet 650 mg  650 mg Oral Q6H PRN Yanique ERAZO MD   650 mg at 11/13/20 2343    Or    acetaminophen (TYLENOL) suppository 650 mg  650 mg Rectal Q6H PRN Jules Padilla MD        cholecalciferol (VITAMIN D3) (1000 Units /25 mcg) tablet 2 Tab  2,000 Units Oral DAILY Jules Padilla MD   2 Tab at 11/14/20 1038    guaiFENesin-dextromethorphan (ROBITUSSIN DM) 100-10 mg/5 mL syrup 5 mL  5 mL Oral Q4H PRN Yanique ERAZO MD        sodium chloride (NS) flush 5-40 mL  5-40 mL IntraVENous Q8H Jules Padilla MD   10 mL at 11/14/20 1344    sodium chloride (NS) flush 5-40 mL  5-40 mL IntraVENous PRN Yanique ERAZO MD        bisacodyL (DULCOLAX) tablet 5 mg  5 mg Oral DAILY PRN Yanique ERAZO MD        glucose chewable tablet 16 g  4 Tab Oral PRN Yanique ERAZO MD        dextrose (D50W) injection syrg 12.5-25 g  25-50 mL IntraVENous PRN Yanique ERAZO MD        glucagon (GLUCAGEN) injection 1 mg  1 mg IntraMUSCular PRN Yanique ERAZO MD        insulin lispro (HUMALOG) injection   SubCUTAneous Ritu ERAZO MD   7 Units at 11/14/20 1330    dexamethasone (DECADRON) 4 mg/mL injection 6 mg  6 mg IntraVENous Q6H Omar Mccain MD   Stopped at 20 1200    benzonatate (TESSALON) capsule 100 mg  100 mg Oral TID PRN Prudencio Enriquez MD   100 mg at 20 2343    insulin glargine (LANTUS) injection 20 Units  20 Units SubCUTAneous DAILY Femi ERAZO MD   20 Units at 20 1039    remdesivir 100 mg in 0.9% sodium chloride 250 mL IVPB  100 mg IntraVENous Q24H Preet Reeves MD   100 mg at 20 1330    azithromycin (ZITHROMAX) tablet 500 mg  500 mg Oral DAILY Yojana Velasquez MD   500 mg at 20 1038    0.9% sodium chloride infusion  125 mL/hr IntraVENous CONTINUOUS Femi ERAZO  mL/hr at 20 0609 125 mL/hr at 20 0609            No Known Allergies        Objective:     Blood pressure 121/78, pulse 97, temperature 98.3 °F (36.8 °C), resp. rate 28, height 5' 10\" (1.778 m), weight 110 kg (242 lb 8.1 oz), SpO2 94 %. Temp (24hrs), Av.8 °F (37.1 °C), Min:98.3 °F (36.8 °C), Max:99.8 °F (37.7 °C)      Intake and Output:  Current Shift: No intake/output data recorded. Last 3 Shifts:  1901 -  0700  In: 1000 [I.V.:1000]  Out: -     Physical Exam:    General: Lying in bed comfortably, no acute distress  Eye: Reactive, symmetric  Throat and Neck: Supple  Lung: Reduced air entry bilaterally with prolonged exhalation but no wheezing. Occasional crackles. Heart: S1+S2. No murmurs  Abdomen: soft, non-tender. Bowel sounds normal. No masses; obese  Extremities: No edema  : Not done  Skin: No cyanosis  Neurologic: A & O x3.   Grossly nonfocal  Psychiatric: Appropriate affect; coherent    Lab/Data Review:  Recent Results (from the past 24 hour(s))   GLUCOSE, POC    Collection Time: 20  6:15 PM   Result Value Ref Range    Glucose (POC) 342 (H) 65 - 100 mg/dL    Performed by [address], POC    Collection Time: 20  9:30 PM   Result Value Ref Range    Glucose (POC) 358 (H) 65 - 100 mg/dL    Performed by Deneen Bennett    SARS-COV-2    Collection Time: 11/14/20  2:15 AM   Result Value Ref Range    SARS-CoV-2 Please find results under separate order     GLUCOSE, POC    Collection Time: 11/14/20  5:02 AM   Result Value Ref Range    Glucose (POC) 273 (H) 65 - 100 mg/dL    Performed by Suresh Mendoza    PROTHROMBIN TIME + INR    Collection Time: 11/14/20  6:02 AM   Result Value Ref Range    Prothrombin time 14.7 11.9 - 14.7 sec    INR 1.1 0.9 - 1.1     PTT    Collection Time: 11/14/20  6:02 AM   Result Value Ref Range    aPTT 55.0 (H) 23.0 - 35.7 sec    aPTT, therapeutic range   68 - 109 sec   FIBRINOGEN    Collection Time: 11/14/20  6:02 AM   Result Value Ref Range    Fibrinogen Quantity not sufficient, suggest recollection 220 - 535 mg/dL   D DIMER    Collection Time: 11/14/20  6:02 AM   Result Value Ref Range    D DIMER 1.83 (H) <0.50 ug/ml(FEU)   METABOLIC PANEL, BASIC    Collection Time: 11/14/20  6:02 AM   Result Value Ref Range    Sodium 135 (L) 136 - 145 mmol/L    Potassium 3.7 3.5 - 5.1 mmol/L    Chloride 101 97 - 108 mmol/L    CO2 25 21 - 32 mmol/L    Anion gap 9 5 - 15 mmol/L    Glucose 301 (H) 65 - 100 mg/dL    BUN 20 6 - 20 mg/dL    Creatinine 0.77 0.70 - 1.30 mg/dL    BUN/Creatinine ratio 26 (H) 12 - 20      GFR est AA >60 >60 ml/min/1.73m2    GFR est non-AA >60 >60 ml/min/1.73m2    Calcium 8.4 (L) 8.5 - 10.1 mg/dL     chest X-ray      XR CHEST SNGL V   Final Result   Addendum 1 of 1   Addendum: Addendum/   impression:      Infrahilar disease on the right corresponds to mass seen on CT exam    reported   separately. Please see that report for further assessment pulmonary    findings. Final      CT CHEST WO CONT   Final Result   Impression:      1. 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 Follow-up   contrast enhanced CT of the chest recommended.    2.  Patchy bilateral airspace disease in both lungs consistent with reported   history of atypical viral infection/COVID pneumonia. 3.  Diffusely thickened esophageal wall of uncertain etiology and significance. 4.  Coronary atherosclerosis. 5.  Additional findings as above. CT Results  (Last 48 hours)               11/12/20 2225  CT CHEST WO CONT Final result    Impression:  Impression:       1. 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 Follow-up   contrast enhanced CT of the chest recommended. 2.  Patchy bilateral airspace disease in both lungs consistent with reported   history of atypical viral infection/COVID pneumonia. 3.  Diffusely thickened esophageal wall of uncertain etiology and significance. 4.  Coronary atherosclerosis. 5.  Additional findings as above. Narrative:  CT CHEST WO CONT     11/12/2020 10:25 PM ; SRM        Indication: 39years old; Male ; sob, CHEST TIGHTNESS, covid+ ; covid +       Technique: Axial CT imaging of the chest was performed according to standard   protocol. Multiplanar reconstructions are provided. One or more of the following   dose reduction techniques were used to: Automatic exposure control, adjustment   of the mA and/or kV according to patient size, use of the aortic reconstruction   techniques. Intravenous contrast: None       Dose reduction: All CT scans at this facility are performed using dose reduction   optimization techniques as appropriate to the performed exam including the   following: Automatic exposure control; adjustment of the mA and/or kV according   to patient size; or the use of reconstruction techniques. Comparison: September 22, 2018 chest x-ray;       Findings:       Lungs: Patchy bilateral upper lobe airspace disease with air bronchograms. More   confluent consolidation at the left lung base. There are some consolidated   regions at the right lung base as well along the margins of a more masslike   abnormality described below. Large airways:  The visualized trachea and central bronchial tree are patent. No   endobronchial lesions are evident. Pleura: Unremarkable. Vessels: Atherosclerotic calcification of the aorta. Remaining vessels otherwise   unremarkable noncontrast assessment. Heart: Coronary atherosclerosis. The heart is normal in size and contour. No   pericardial effusion. Mediastinum and siddharth: The visualized thyroid is unremarkable. Pathologic   paratracheal and hilar adenopathy on the right. There is a large masslike   abnormality which may be contiguous with the lower thoracic esophagus measuring   approximately 7.5 cm x 7.5 cm in cross-section and 8.5 cm cranial-caudal. There   is abnormal thickening of the esophageal wall in a fairly diffuse fashion from   just below the thoracic inlet through the gastroesophageal junction. Chest wall/soft tissues: Unremarkable. Upper abdomen: Pancreatic atrophy. Otherwise unremarkable. Bones: Multilevel thoracic degenerative findings. No erosive or destructive   changes. Lines and devices: There are no indwelling lines or devices identified. Assessment:     1. Sepsis  2. COVID-19 infection  3. Paraesophageal mass  4. LISA    Plan:     1. Sepsis:  Patient has developed sepsis from COVID-19 infection. On Zosyn and azithromycin  His WBC count is elevated, lactic acid level 6.2. He is volume contracted. Continue IV fluids  Repeat lactic acid 2.8    2.  COVID-19 infection:  He has been confirmed to have COVID-19 infection. His CT scan of chest has shown bilateral scattered infiltrates. He is also noted to have paraesophageal mass  Patient will get started on remdesivir 200 mg first day and then 100 mg every day for 5 days  Received Actemra. Continue Zosyn and azithromycin  Continue remdesivir started 11/13  Continue dexamethasone    3. 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   Recommend GI evaluation  Any thoracic surgical input if GI unable to biopsy    4. LISA:  Improved after his IV hydration. We will hold his statins for now. DVT and GI prophylaxis    Questions of patient were answered at bedside in detail  Case discussed in detail with RN, RT, and care team    Thank you for involving me in the care of this patient  I will follow with you closely during hospitalization    Time spent more than 30 minutes in direct patient care with no overlap reviewing results and records, decision-making, and answering questions. Lucas Sutherland MD  [facility]  11/14/2020  3:01 PM    This documentation was prepared using voice recognition software. Typographical errors may occur.   Attempt has been made to fix those errors however inadvertent errors may persist

## 2020-11-15 ENCOUNTER — APPOINTMENT (OUTPATIENT)
Dept: CT IMAGING | Age: 45
DRG: 871 | End: 2020-11-15
Attending: INTERNAL MEDICINE
Payer: COMMERCIAL

## 2020-11-15 LAB
APTT PPP: 55.2 SEC (ref 23–35.7)
BASOPHILS # BLD: 0 K/UL (ref 0–0.1)
BASOPHILS NFR BLD: 0 % (ref 0–1)
D DIMER PPP FEU-MCNC: 1.5 UG/ML(FEU)
DIFFERENTIAL METHOD BLD: ABNORMAL
EOSINOPHIL # BLD: 0 K/UL (ref 0–0.4)
EOSINOPHIL NFR BLD: 0 % (ref 0–7)
ERYTHROCYTE [DISTWIDTH] IN BLOOD BY AUTOMATED COUNT: 14.1 % (ref 11.5–14.5)
FIBRINOGEN PPP-MCNC: 992 MG/DL (ref 220–535)
GLUCOSE BLD STRIP.AUTO-MCNC: 297 MG/DL (ref 65–100)
GLUCOSE BLD STRIP.AUTO-MCNC: 315 MG/DL (ref 65–100)
GLUCOSE BLD STRIP.AUTO-MCNC: 352 MG/DL (ref 65–100)
GLUCOSE BLD STRIP.AUTO-MCNC: 360 MG/DL (ref 65–100)
HCT VFR BLD AUTO: 37.6 % (ref 36.6–50.3)
HGB BLD-MCNC: 12.9 G/DL (ref 12.1–17)
IMM GRANULOCYTES # BLD AUTO: 0.1 K/UL (ref 0–0.04)
IMM GRANULOCYTES NFR BLD AUTO: 1 % (ref 0–0.5)
INR PPP: 1.1 (ref 0.9–1.1)
LYMPHOCYTES # BLD: 1.1 K/UL (ref 0.8–3.5)
LYMPHOCYTES NFR BLD: 8 % (ref 12–49)
MCH RBC QN AUTO: 30.4 PG (ref 26–34)
MCHC RBC AUTO-ENTMCNC: 34.3 G/DL (ref 30–36.5)
MCV RBC AUTO: 88.7 FL (ref 80–99)
MONOCYTES # BLD: 0.9 K/UL (ref 0–1)
MONOCYTES NFR BLD: 7 % (ref 5–13)
NEUTS SEG # BLD: 10.7 K/UL (ref 1.8–8)
NEUTS SEG NFR BLD: 84 % (ref 32–75)
PERFORMED BY, TECHID: ABNORMAL
PLATELET # BLD AUTO: 211 K/UL (ref 150–400)
PMV BLD AUTO: 12.1 FL (ref 8.9–12.9)
PROTHROMBIN TIME: 14.6 SEC (ref 11.9–14.7)
RBC # BLD AUTO: 4.24 M/UL (ref 4.1–5.7)
THERAPEUTIC RANGE,PTTT: ABNORMAL SEC (ref 68–109)
WBC # BLD AUTO: 12.7 K/UL (ref 4.1–11.1)

## 2020-11-15 PROCEDURE — 74011000250 HC RX REV CODE- 250: Performed by: INTERNAL MEDICINE

## 2020-11-15 PROCEDURE — 85384 FIBRINOGEN ACTIVITY: CPT

## 2020-11-15 PROCEDURE — 74011250637 HC RX REV CODE- 250/637: Performed by: INTERNAL MEDICINE

## 2020-11-15 PROCEDURE — 65270000029 HC RM PRIVATE

## 2020-11-15 PROCEDURE — 99232 SBSQ HOSP IP/OBS MODERATE 35: CPT | Performed by: INTERNAL MEDICINE

## 2020-11-15 PROCEDURE — 74011636637 HC RX REV CODE- 636/637: Performed by: INTERNAL MEDICINE

## 2020-11-15 PROCEDURE — 74011250636 HC RX REV CODE- 250/636: Performed by: INTERNAL MEDICINE

## 2020-11-15 PROCEDURE — 74011000636 HC RX REV CODE- 636: Performed by: INTERNAL MEDICINE

## 2020-11-15 PROCEDURE — 85610 PROTHROMBIN TIME: CPT

## 2020-11-15 PROCEDURE — 36415 COLL VENOUS BLD VENIPUNCTURE: CPT

## 2020-11-15 PROCEDURE — 74011000258 HC RX REV CODE- 258: Performed by: INTERNAL MEDICINE

## 2020-11-15 PROCEDURE — 82962 GLUCOSE BLOOD TEST: CPT

## 2020-11-15 PROCEDURE — 74160 CT ABDOMEN W/CONTRAST: CPT

## 2020-11-15 PROCEDURE — 71260 CT THORAX DX C+: CPT

## 2020-11-15 PROCEDURE — 85025 COMPLETE CBC W/AUTO DIFF WBC: CPT

## 2020-11-15 PROCEDURE — 85730 THROMBOPLASTIN TIME PARTIAL: CPT

## 2020-11-15 PROCEDURE — 85379 FIBRIN DEGRADATION QUANT: CPT

## 2020-11-15 RX ORDER — CHOLECALCIFEROL (VITAMIN D3) 125 MCG
5 CAPSULE ORAL
Status: DISCONTINUED | OUTPATIENT
Start: 2020-11-15 | End: 2020-11-20 | Stop reason: HOSPADM

## 2020-11-15 RX ORDER — INSULIN GLARGINE 100 [IU]/ML
30 INJECTION, SOLUTION SUBCUTANEOUS DAILY
Status: DISCONTINUED | OUTPATIENT
Start: 2020-11-16 | End: 2020-11-17

## 2020-11-15 RX ORDER — INSULIN LISPRO 100 [IU]/ML
5 INJECTION, SOLUTION INTRAVENOUS; SUBCUTANEOUS
Status: DISCONTINUED | OUTPATIENT
Start: 2020-11-16 | End: 2020-11-17

## 2020-11-15 RX ADMIN — PIPERACILLIN SODIUM AND TAZOBACTAM SODIUM 3.38 G: 3; .375 INJECTION, POWDER, LYOPHILIZED, FOR SOLUTION INTRAVENOUS at 00:42

## 2020-11-15 RX ADMIN — MELATONIN TAB 5 MG 5 MG: 5 TAB at 21:56

## 2020-11-15 RX ADMIN — DEXAMETHASONE SODIUM PHOSPHATE 6 MG: 4 INJECTION, SOLUTION INTRA-ARTICULAR; INTRALESIONAL; INTRAMUSCULAR; INTRAVENOUS; SOFT TISSUE at 00:42

## 2020-11-15 RX ADMIN — DEXAMETHASONE SODIUM PHOSPHATE 6 MG: 4 INJECTION, SOLUTION INTRA-ARTICULAR; INTRALESIONAL; INTRAMUSCULAR; INTRAVENOUS; SOFT TISSUE at 17:22

## 2020-11-15 RX ADMIN — INSULIN LISPRO 7 UNITS: 100 INJECTION, SOLUTION INTRAVENOUS; SUBCUTANEOUS at 17:23

## 2020-11-15 RX ADMIN — AZITHROMYCIN MONOHYDRATE 500 MG: 500 TABLET ORAL at 08:21

## 2020-11-15 RX ADMIN — IOPAMIDOL 100 ML: 755 INJECTION, SOLUTION INTRAVENOUS at 20:49

## 2020-11-15 RX ADMIN — INSULIN LISPRO 5 UNITS: 100 INJECTION, SOLUTION INTRAVENOUS; SUBCUTANEOUS at 08:22

## 2020-11-15 RX ADMIN — PIPERACILLIN SODIUM AND TAZOBACTAM SODIUM 3.38 G: 3; .375 INJECTION, POWDER, LYOPHILIZED, FOR SOLUTION INTRAVENOUS at 17:22

## 2020-11-15 RX ADMIN — ENOXAPARIN SODIUM 30 MG: 100 INJECTION SUBCUTANEOUS at 21:56

## 2020-11-15 RX ADMIN — ENOXAPARIN SODIUM 30 MG: 100 INJECTION SUBCUTANEOUS at 08:21

## 2020-11-15 RX ADMIN — PIPERACILLIN SODIUM AND TAZOBACTAM SODIUM 3.38 G: 3; .375 INJECTION, POWDER, LYOPHILIZED, FOR SOLUTION INTRAVENOUS at 08:22

## 2020-11-15 RX ADMIN — INSULIN GLARGINE 20 UNITS: 100 INJECTION, SOLUTION SUBCUTANEOUS at 08:22

## 2020-11-15 RX ADMIN — DEXAMETHASONE SODIUM PHOSPHATE 6 MG: 4 INJECTION, SOLUTION INTRA-ARTICULAR; INTRALESIONAL; INTRAMUSCULAR; INTRAVENOUS; SOFT TISSUE at 06:00

## 2020-11-15 RX ADMIN — INSULIN LISPRO 7 UNITS: 100 INJECTION, SOLUTION INTRAVENOUS; SUBCUTANEOUS at 12:15

## 2020-11-15 RX ADMIN — Medication 10 ML: at 12:15

## 2020-11-15 RX ADMIN — Medication 2 TABLET: at 08:21

## 2020-11-15 RX ADMIN — INSULIN HUMAN 10 UNITS: 100 INJECTION, SOLUTION PARENTERAL at 21:56

## 2020-11-15 RX ADMIN — DEXAMETHASONE SODIUM PHOSPHATE 6 MG: 4 INJECTION, SOLUTION INTRA-ARTICULAR; INTRALESIONAL; INTRAMUSCULAR; INTRAVENOUS; SOFT TISSUE at 12:15

## 2020-11-15 RX ADMIN — DIATRIZOATE MEGLUMINE AND DIATRIZOATE SODIUM 30 ML: 660; 100 LIQUID ORAL; RECTAL at 18:43

## 2020-11-15 RX ADMIN — SODIUM CHLORIDE 100 MG: 9 INJECTION, SOLUTION INTRAVENOUS at 12:15

## 2020-11-15 NOTE — PROGRESS NOTES
Hematology/Oncology   Progress Note    Patient: Wes Rhoades MRN: [id number]     YOB: 1975  Age: 39 y.o. Sex: male      Admit Date: 11/12/2020    LOS: 2 days     Chief Complaint: Admitted with worsening shortness of breath in the setting of recent COVID infection    I did not examine the patient in his room. I did talk to him on the phone and also discussed his care with his nurse.       Subjective:     Denies dysphagia. Feels a whole lot better today. Breathing has improved. No fever today. Constitutional No fevers, chills, night sweats, excessive fatigue or weight loss. Allergic/Immunologic No recent allergic reactions   Eyes No significant visual difficulties. No diplopia. ENMT No problems with hearing, no sore throat, no sinus drainage. Endocrine No hot flashes or night sweats. No cold intolerance, polyuria, or polydipsia   Hematologic/Lymphatic No easy bruising or bleeding. The patient denies any tender or palpable lymph nodes   Breasts No abnormal masses of breast, nipple discharge or pain. Respiratory No dyspnea on exertion, orthopnea, chest pain, cough or hemoptysis. Cardiovascular No anginal chest pain, irregular heart beat, tachycardia, palpitations or orthopnea. Gastrointestinal No nausea, vomiting, diarrhea, constipation, cramping, dysphagia, reflux, heartburn, GI bleeding, or early satiety. No change in bowel habits. Genitourinary (M) No hematuria, dysuria, increased frequency, urgency, hesitancy or incontinence. Musculoskeletal No joint pain, swelling or redness. No decreased range of motion. Integumentary No chronic rashes, inflammation, ulcerations, pruritus, petechiae, purpura, ecchymoses, or skin changes. Neurologic No headache, blurred vision, and no areas of focal weakness or numbness. Normal gait. No sensory problems. Psychiatric No insomnia, depression, ben or mood swings. No psychotropic drugs.         Current Facility-Administered Medications:     piperacillin-tazobactam (ZOSYN) 3.375 g in 0.9% sodium chloride (MBP/ADV) 100 mL MBP, 3.375 g, IntraVENous, Q8H, Reynold Thornton MD, Last Rate: 25 mL/hr at 11/15/20 0822, 3.375 g at 11/15/20 0189    enoxaparin (LOVENOX) injection 30 mg, 30 mg, SubCUTAneous, Q12H, Reynold Thornton MD, 30 mg at 11/15/20 1876    acetaminophen (TYLENOL) tablet 650 mg, 650 mg, Oral, Q6H PRN, 650 mg at 11/13/20 2343 **OR** acetaminophen (TYLENOL) suppository 650 mg, 650 mg, Rectal, Q6H PRN, Silvia ERAZO MD    cholecalciferol (VITAMIN D3) (1000 Units /25 mcg) tablet 2 Tab, 2,000 Units, Oral, DAILY, Silvia ERAZO MD, 2 Tab at 11/15/20 0821    guaiFENesin-dextromethorphan (ROBITUSSIN DM) 100-10 mg/5 mL syrup 5 mL, 5 mL, Oral, Q4H PRN, Silvia ERAZO MD    sodium chloride (NS) flush 5-40 mL, 5-40 mL, IntraVENous, Q8H, Reynold Thornton MD, 10 mL at 11/15/20 1215    sodium chloride (NS) flush 5-40 mL, 5-40 mL, IntraVENous, PRN, Reynold Thornton MD    bisacodyL (DULCOLAX) tablet 5 mg, 5 mg, Oral, DAILY PRN, Silvia ERAZO MD    glucose chewable tablet 16 g, 4 Tab, Oral, PRN, Reynold Stanton MD    dextrose (D50W) injection syrg 12.5-25 g, 25-50 mL, IntraVENous, PRN, Reynold Thornton MD    glucagon (GLUCAGEN) injection 1 mg, 1 mg, IntraMUSCular, PRN, Reynold Stanton MD    insulin lispro (HUMALOG) injection, , SubCUTAneous, TIDAC, Reynold Thornton MD, 7 Units at 11/15/20 1215    dexamethasone (DECADRON) 4 mg/mL injection 6 mg, 6 mg, IntraVENous, Q6H, Yolanda Hsu MD, 6 mg at 11/15/20 1215    benzonatate (TESSALON) capsule 100 mg, 100 mg, Oral, TID PRN, Preet Reeves MD, 100 mg at 11/13/20 2343    insulin glargine (LANTUS) injection 20 Units, 20 Units, SubCUTAneous, DAILY, Reynold Thornton MD, 20 Units at 11/15/20 0822    remdesivir 100 mg in 0.9% sodium chloride 250 mL IVPB, 100 mg, IntraVENous, Q24H, Preet Reeves MD, 100 mg at 11/15/20 1215    azithromycin ([place] Regulus) tablet 500 mg, 500 mg, Oral, DAILY, AgaReynold edge MD, 500 mg at 11/15/20 0821    0.9% sodium chloride infusion, 125 mL/hr, IntraVENous, CONTINUOUS, Lima ERAZO MD, Last Rate: 125 mL/hr at 11/13/20 0609, 125 mL/hr at 11/13/20 0609     Objective:     Vitals:    11/14/20 1745 11/14/20 2001 11/15/20 0038 11/15/20 0820   BP: 118/77 116/78 116/79 117/81   Pulse: 92 90 84 89   Resp: 20 18 18 26   Temp: 98.4 °F (36.9 °C) 98.8 °F (37.1 °C) 97.5 °F (36.4 °C) 97.5 °F (36.4 °C)   SpO2: 92% 95% 92% 94%   Weight:       Height:              Physical Exam:       Lab/Data Review:  Recent Labs     11/15/20  0602 11/12/20  2200   WBC 12.7* 28.4*   HGB 12.9 16.2   HCT 37.6 46.5    112*     Recent Labs     11/15/20  0602 11/14/20  0602 11/12/20  2200   NA  --  135* 131*   K  --  3.7 3.4*   CL  --  101 95*   CO2  --  25 23   GLU  --  301* 338*   BUN  --  20 22*   CREA  --  0.77 1.48*   CA  --  8.4* 8.7   ALB  --   --  2.7*   TBILI  --   --  0.5   ALT  --   --  25   INR 1.1 1.1  --      No results for input(s): PH, PCO2, PO2, HCO3, FIO2 in the last 72 hours.   Recent Results (from the past 24 hour(s))   GLUCOSE, POC    Collection Time: 11/14/20  5:45 PM   Result Value Ref Range    Glucose (POC) 277 (H) 65 - 100 mg/dL    Performed by Carine Millan    GLUCOSE, POC    Collection Time: 11/14/20 10:13 PM   Result Value Ref Range    Glucose (POC) 306 (H) 65 - 100 mg/dL    Performed by Sridhar Davila + INR    Collection Time: 11/15/20  6:02 AM   Result Value Ref Range    Prothrombin time 14.6 11.9 - 14.7 sec    INR 1.1 0.9 - 1.1     PTT    Collection Time: 11/15/20  6:02 AM   Result Value Ref Range    aPTT 55.2 (H) 23.0 - 35.7 sec    aPTT, therapeutic range   68 - 109 sec   FIBRINOGEN    Collection Time: 11/15/20  6:02 AM   Result Value Ref Range    Fibrinogen 992 (H) 220 - 535 mg/dL   D DIMER    Collection Time: 11/15/20  6:02 AM   Result Value Ref Range    D DIMER 1.50 (H) <0.50 ug/ml(FEU)   CBC WITH AUTOMATED DIFF    Collection Time: 11/15/20  6:02 AM   Result Value Ref Range    WBC 12.7 (H) 4.1 - 11.1 K/uL    RBC 4.24 4.10 - 5.70 M/uL    HGB 12.9 12.1 - 17.0 g/dL    HCT 37.6 36.6 - 50.3 %    MCV 88.7 80.0 - 99.0 FL    MCH 30.4 26.0 - 34.0 PG    MCHC 34.3 30.0 - 36.5 g/dL    RDW 14.1 11.5 - 14.5 %    PLATELET 094 590 - 205 K/uL    MPV 12.1 8.9 - 12.9 FL    NEUTROPHILS 84 (H) 32 - 75 %    LYMPHOCYTES 8 (L) 12 - 49 %    MONOCYTES 7 5 - 13 %    EOSINOPHILS 0 0 - 7 %    BASOPHILS 0 0 - 1 %    IMMATURE GRANULOCYTES 1 (H) 0.0 - 0.5 %    ABS. NEUTROPHILS 10.7 (H) 1.8 - 8.0 K/UL    ABS. LYMPHOCYTES 1.1 0.8 - 3.5 K/UL    ABS. MONOCYTES 0.9 0.0 - 1.0 K/UL    ABS. EOSINOPHILS 0.0 0.0 - 0.4 K/UL    ABS. BASOPHILS 0.0 0.0 - 0.1 K/UL    ABS. IMM. GRANS. 0.1 (H) 0.00 - 0.04 K/UL    DF AUTOMATED     GLUCOSE, POC    Collection Time: 11/15/20  7:43 AM   Result Value Ref Range    Glucose (POC) 297 (H) 65 - 100 mg/dL    Performed by [address] [place] [address], POC    Collection Time: 11/15/20 11:17 AM   Result Value Ref Range    Glucose (POC) 352 (H) 65 - 100 mg/dL    Performed by Konstantin Aaron         Radiology:   CT Results  (Last 48 hours)    None           Assessment and Plan: Active Problems:    Pneumonia due to COVID-19 virus (11/13/2020)      Sepsis ([facility] [address] (11/13/2020)    Paraesophageal Mass;  - Large lower thorax/mediastinal mass noted on CT.  - Differential includes lymphoma, primary esophageal.  - Seen by Dr. Rosa Freeman who recommended scans with contrast for further evaluation. - CT Abdomen was ordered. I added on CT chest with IV contrast.     Leukocytosis:  - Likely due to COVID pneumonitis. - Management per primary team.  - Much improved today.     Thrombocytopenia:  - Mild. - Normal today.     DVT Prophylaxis:  - Consider starting prophylactic anticoagulation after invasive procedures are completed.       Signed By: Fazal Harrington MD     November 15, 2020

## 2020-11-15 NOTE — PROGRESS NOTES
Pulmonology and Critical Care Progress Note    Subjective:     Chief Complaint:   Chief Complaint   Patient presents with    Chest Pain          Patient seen and examined  Overnight as noted    Lying in bed comfortably  Awake and alert  On room air now  Gradual improvement in respiratory status  No acute distress      Review of Systems:  A comprehensive review of systems was negative except for that written in the HPI.     Current Facility-Administered Medications   Medication Dose Route Frequency Provider Last Rate Last Dose    piperacillin-tazobactam (ZOSYN) 3.375 g in 0.9% sodium chloride (MBP/ADV) 100 mL MBP  3.375 g IntraVENous Q8H yLnn ERAZO MD 25 mL/hr at 11/15/20 0822 3.375 g at 11/15/20 2544    enoxaparin (LOVENOX) injection 30 mg  30 mg SubCUTAneous Q12H Lynn ERAZO MD   30 mg at 11/15/20 0840    acetaminophen (TYLENOL) tablet 650 mg  650 mg Oral Q6H PRN Lynn ERAZO MD   650 mg at 11/13/20 2343    Or    acetaminophen (TYLENOL) suppository 650 mg  650 mg Rectal Q6H PRN Elvie Francisco MD        cholecalciferol (VITAMIN D3) (1000 Units /25 mcg) tablet 2 Tab  2,000 Units Oral DAILY Evlie Francisco MD   2 Tab at 11/15/20 0821    guaiFENesin-dextromethorphan (ROBITUSSIN DM) 100-10 mg/5 mL syrup 5 mL  5 mL Oral Q4H PRN Lynn ERAZO MD        sodium chloride (NS) flush 5-40 mL  5-40 mL IntraVENous Q8H Reynold Thornton MD   10 mL at 11/15/20 1215    sodium chloride (NS) flush 5-40 mL  5-40 mL IntraVENous PRN Lynn ERAZO MD        bisacodyL (DULCOLAX) tablet 5 mg  5 mg Oral DAILY PRALEXA ERAZO MD        glucose chewable tablet 16 g  4 Tab Oral PRN Lynn ERAZO MD        dextrose (D50W) injection syrg 12.5-25 g  25-50 mL IntraVENous PRN Lynn ERAZO MD        glucagon (GLUCAGEN) injection 1 mg  1 mg IntraMUSCular PRALEXA ERAZO MD        insulin lispro (HUMALOG) injection   SubCUTAneous Merrily Duet Lynn ERAZO MD   7 Units at 11/15/20 2026    dexamethasone (DECADRON) 4 mg/mL injection 6 mg  6 mg IntraVENous Q6H Shannon Connell MD   6 mg at 11/15/20 1215    benzonatate (TESSALON) capsule 100 mg  100 mg Oral TID PRN Marek Washington MD   100 mg at 20 2343    insulin glargine (LANTUS) injection 20 Units  20 Units SubCUTAneous DAILY Camilla Gonzales MD   20 Units at 11/15/20 8845    remdesivir 100 mg in 0.9% sodium chloride 250 mL IVPB  100 mg IntraVENous Q24H Preet Reeves MD   100 mg at 11/15/20 1215    azithromycin (ZITHROMAX) tablet 500 mg  500 mg Oral DAILY Camilla Gonzales MD   500 mg at 11/15/20 1696    0.9% sodium chloride infusion  125 mL/hr IntraVENous CONTINUOUS Juany ERAZO  mL/hr at 20 0609 125 mL/hr at 20 0609            No Known Allergies        Objective:     Blood pressure 117/81, pulse 89, temperature 97.5 °F (36.4 °C), resp. rate 26, height 5' 10\" (1.778 m), weight 110 kg (242 lb 8.1 oz), SpO2 94 %. Temp (24hrs), Av.1 °F (36.7 °C), Min:97.5 °F (36.4 °C), Max:98.8 °F (37.1 °C)      Intake and Output:  Current Shift: No intake/output data recorded. Last 3 Shifts: No intake/output data recorded. Physical Exam:    General: Lying in bed comfortably, no acute distress  Eye: Reactive, symmetric  Throat and Neck: Supple  Lung: Reduced air entry bilaterally with prolonged exhalation but no wheezing. Occasional crackles. Heart: S1+S2. No murmurs  Abdomen: soft, non-tender. Bowel sounds normal. No masses; obese  Extremities: No edema  : Not done  Skin: No cyanosis  Neurologic: A & O x3.   Grossly nonfocal  Psychiatric: Appropriate affect; coherent    Lab/Data Review:  Recent Results (from the past 24 hour(s))   GLUCOSE, POC    Collection Time: 20  5:45 PM   Result Value Ref Range    Glucose (POC) 277 (H) 65 - 100 mg/dL    Performed by Jagjit Kim    GLUCOSE, POC    Collection Time: 20 10:13 PM   Result Value Ref Range    Glucose (POC) 306 (H) 65 - 100 mg/dL    Performed by [facility] ERIN    PROTHROMBIN TIME + INR    Collection Time: 11/15/20  6:02 AM   Result Value Ref Range    Prothrombin time 14.6 11.9 - 14.7 sec    INR 1.1 0.9 - 1.1     PTT    Collection Time: 11/15/20  6:02 AM   Result Value Ref Range    aPTT 55.2 (H) 23.0 - 35.7 sec    aPTT, therapeutic range   68 - 109 sec   FIBRINOGEN    Collection Time: 11/15/20  6:02 AM   Result Value Ref Range    Fibrinogen 992 (H) 220 - 535 mg/dL   D DIMER    Collection Time: 11/15/20  6:02 AM   Result Value Ref Range    D DIMER PENDING ug/ml(FEU)   CBC WITH AUTOMATED DIFF    Collection Time: 11/15/20  6:02 AM   Result Value Ref Range    WBC 12.7 (H) 4.1 - 11.1 K/uL    RBC 4.24 4.10 - 5.70 M/uL    HGB 12.9 12.1 - 17.0 g/dL    HCT 37.6 36.6 - 50.3 %    MCV 88.7 80.0 - 99.0 FL    MCH 30.4 26.0 - 34.0 PG    MCHC 34.3 30.0 - 36.5 g/dL    RDW 14.1 11.5 - 14.5 %    PLATELET 268 137 - 973 K/uL    MPV 12.1 8.9 - 12.9 FL    NEUTROPHILS 84 (H) 32 - 75 %    LYMPHOCYTES 8 (L) 12 - 49 %    MONOCYTES 7 5 - 13 %    EOSINOPHILS 0 0 - 7 %    BASOPHILS 0 0 - 1 %    IMMATURE GRANULOCYTES 1 (H) 0.0 - 0.5 %    ABS. NEUTROPHILS 10.7 (H) 1.8 - 8.0 K/UL    ABS. LYMPHOCYTES 1.1 0.8 - 3.5 K/UL    ABS. MONOCYTES 0.9 0.0 - 1.0 K/UL    ABS. EOSINOPHILS 0.0 0.0 - 0.4 K/UL    ABS. BASOPHILS 0.0 0.0 - 0.1 K/UL    ABS. IMM. GRANS. 0.1 (H) 0.00 - 0.04 K/UL    DF AUTOMATED     GLUCOSE, POC    Collection Time: 11/15/20  7:43 AM   Result Value Ref Range    Glucose (POC) 297 (H) 65 - 100 mg/dL    Performed by [address] [place] [address], POC    Collection Time: 11/15/20 11:17 AM   Result Value Ref Range    Glucose (POC) 352 (H) 65 - 100 mg/dL    Performed by Livermore Shahlaer      chest X-ray      XR CHEST SNGL V   Final Result   Addendum 1 of 1   Addendum: Addendum/   impression:      Infrahilar disease on the right corresponds to mass seen on CT exam    reported   separately. Please see that report for further assessment pulmonary    findings.       Final      CT CHEST WO CONT   Final Result   Impression:      1. 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 Follow-up   contrast enhanced CT of the chest recommended. 2.  Patchy bilateral airspace disease in both lungs consistent with reported   history of atypical viral infection/COVID pneumonia. 3.  Diffusely thickened esophageal wall of uncertain etiology and significance. 4.  Coronary atherosclerosis. 5.  Additional findings as above. CT ABD W CONT    (Results Pending)       CT Results  (Last 48 hours)    None          Assessment:     1. Sepsis  2. COVID-19 infection  3. Paraesophageal mass  4. LISA    Plan:     1. Sepsis:  Patient has developed sepsis from COVID-19 infection. On Zosyn and azithromycin  His WBC count is elevated, lactic acid level 6.2. He is volume contracted. Continue IV fluids  Repeat lactic acid 2.8    2.  COVID-19 infection:  He has been confirmed to have COVID-19 infection. His CT scan of chest has shown bilateral scattered infiltrates. He is also noted to have paraesophageal mass  Patient will get started on remdesivir 200 mg first day and then 100 mg every day for 5 days  Received Actemra. Continue Zosyn and azithromycin  Continue remdesivir started 11/13  Continue dexamethasone    3. 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 Appreciate GI evaluation  Agree with contrast CT  Any thoracic surgical input if GI unable to biopsy    4. LISA:  Improved after his IV hydration. We will hold his statins for now. DVT and GI prophylaxis    Questions of patient were answered at bedside in detail  Case discussed in detail with RN, RT, and care team    Thank you for involving me in the care of this patient  I will follow with you closely during hospitalization    Time spent more than 30 minutes in direct patient care with no overlap reviewing results and records, decision-making, and answering questions.       Tnude Dillard, MD  [facility]  11/15/2020  3:01 PM    This documentation was prepared using voice recognition software. Typographical errors may occur.   Attempt has been made to fix those errors however inadvertent errors may persist

## 2020-11-15 NOTE — PROGRESS NOTES
Progress Note    Patient: Lilian Jensen MRN: [id number]  SSN: [id number]    YOB: 1975  Age: 39 y.o. Sex: male      Admit Date: 11/12/2020    LOS: 2 days     Subjective:     59-year-old patient with COVID-19 pneumonitis, aspiration pneumonia with oxygen saturation 92 to 95% on remdesivir steroids. Blood sugar is elevated    Objective:     Vitals:    11/14/20 1745 11/14/20 2001 11/15/20 0038 11/15/20 0820   BP: 118/77 116/78 116/79 117/81   Pulse: 92 90 84 89   Resp: 20 18 18 26   Temp: 98.4 °F (36.9 °C) 98.8 °F (37.1 °C) 97.5 °F (36.4 °C) 97.5 °F (36.4 °C)   SpO2: 92% 95% 92% 94%   Weight:       Height:            Intake and Output:  Current Shift: No intake/output data recorded. Last three shifts: No intake/output data recorded.     Physical Exam:   Current Facility-Administered Medications   Medication Dose Route Frequency Provider Last Rate Last Dose    melatonin tablet 5 mg  5 mg Oral QHS Nayana ERAZO MD        [START ON 11/16/2020] insulin glargine (LANTUS) injection 30 Units  30 Units SubCUTAneous DAILY Nayana ERAZO MD       [address] [place] [address] [START ON 11/16/2020] insulin lispro (HUMALOG) injection 5 Units  5 Units SubCUTAneous TIDAC Nayana ERAZO MD        piperacillin-tazobactam (ZOSYN) 3.375 g in 0.9% sodium chloride (MBP/ADV) 100 mL MBP  3.375 g IntraVENous Q8H Nayana ERAZO MD 25 mL/hr at 11/15/20 1722 3.375 g at 11/15/20 1722    enoxaparin (LOVENOX) injection 30 mg  30 mg SubCUTAneous Q12H Nayana ERAZO MD   30 mg at 11/15/20 6841    acetaminophen (TYLENOL) tablet 650 mg  650 mg Oral Q6H PRN Nayana ERAZO MD   650 mg at 11/13/20 2343    Or    acetaminophen (TYLENOL) suppository 650 mg  650 mg Rectal Q6H PRN Nayana ERAZO MD        cholecalciferol (VITAMIN D3) (1000 Units /25 mcg) tablet 2 Tab  2,000 Units Oral DAILY Suraj Sweeney MD   2 Tab at 11/15/20 0822    guaiFENesin-dextromethorphan (ROBITUSSIN DM) 100-10 mg/5 mL syrup 5 mL  5 mL Oral Q4H PRN Sven Jarvis MD        sodium chloride (NS) flush 5-40 mL  5-40 mL IntraVENous Q8H Kelly ERAZO MD   10 mL at 11/15/20 1215    sodium chloride (NS) flush 5-40 mL  5-40 mL IntraVENous PRN Sven Jarvis MD        bisacodyL (DULCOLAX) tablet 5 mg  5 mg Oral DAILY PRN Sven Jarvis MD        glucose chewable tablet 16 g  4 Tab Oral PRN Kelly ERAZO MD        dextrose (D50W) injection syrg 12.5-25 g  25-50 mL IntraVENous PRN Kelly ERAZO MD        glucagon (GLUCAGEN) injection 1 mg  1 mg IntraMUSCular PRN Kelly ERAZO MD        dexamethasone (DECADRON) 4 mg/mL injection 6 mg  6 mg IntraVENous Q6H Tino Coburn MD   6 mg at 11/15/20 1722    benzonatate (TESSALON) capsule 100 mg  100 mg Oral TID PRN Lisa Lim MD   100 mg at 11/13/20 2343    remdesivir 100 mg in 0.9% sodium chloride 250 mL IVPB  100 mg IntraVENous Q24H Preet Reeves MD   100 mg at 11/15/20 1215    azithromycin (ZITHROMAX) tablet 500 mg  500 mg Oral DAILY Sven Jarvis MD   500 mg at 11/15/20 4406    0.9% sodium chloride infusion  125 mL/hr IntraVENous CONTINUOUS Kelly ERAZO  mL/hr at 11/13/20 0609 125 mL/hr at 11/13/20 4917       Lab/Data Review: All lab results for the last 24 hours reviewed.      Recent Results (from the past 24 hour(s))   GLUCOSE, POC    Collection Time: 11/14/20 10:13 PM   Result Value Ref Range    Glucose (POC) 306 (H) 65 - 100 mg/dL    Performed by Gavin Stroud + INR    Collection Time: 11/15/20  6:02 AM   Result Value Ref Range    Prothrombin time 14.6 11.9 - 14.7 sec    INR 1.1 0.9 - 1.1     PTT    Collection Time: 11/15/20  6:02 AM   Result Value Ref Range    aPTT 55.2 (H) 23.0 - 35.7 sec    aPTT, therapeutic range   68 - 109 sec   FIBRINOGEN    Collection Time: 11/15/20  6:02 AM   Result Value Ref Range    Fibrinogen 992 (H) 220 - 535 mg/dL   D DIMER    Collection Time: 11/15/20  6:02 AM   Result Value Ref Range    D DIMER 1.50 (H) <0.50 ug/ml(FEU)   CBC WITH AUTOMATED DIFF    Collection Time: 11/15/20  6:02 AM   Result Value Ref Range    WBC 12.7 (H) 4.1 - 11.1 K/uL    RBC 4.24 4.10 - 5.70 M/uL    HGB 12.9 12.1 - 17.0 g/dL    HCT 37.6 36.6 - 50.3 %    MCV 88.7 80.0 - 99.0 FL    MCH 30.4 26.0 - 34.0 PG    MCHC 34.3 30.0 - 36.5 g/dL    RDW 14.1 11.5 - 14.5 %    PLATELET 726 422 - 562 K/uL    MPV 12.1 8.9 - 12.9 FL    NEUTROPHILS 84 (H) 32 - 75 %    LYMPHOCYTES 8 (L) 12 - 49 %    MONOCYTES 7 5 - 13 %    EOSINOPHILS 0 0 - 7 %    BASOPHILS 0 0 - 1 %    IMMATURE GRANULOCYTES 1 (H) 0.0 - 0.5 %    ABS. NEUTROPHILS 10.7 (H) 1.8 - 8.0 K/UL    ABS. LYMPHOCYTES 1.1 0.8 - 3.5 K/UL    ABS. MONOCYTES 0.9 0.0 - 1.0 K/UL    ABS. EOSINOPHILS 0.0 0.0 - 0.4 K/UL    ABS. BASOPHILS 0.0 0.0 - 0.1 K/UL    ABS. IMM. GRANS. 0.1 (H) 0.00 - 0.04 K/UL    DF AUTOMATED     GLUCOSE, POC    Collection Time: 11/15/20  7:43 AM   Result Value Ref Range    Glucose (POC) 297 (H) 65 - 100 mg/dL    Performed by [address] [place] [address], POC    Collection Time: 11/15/20 11:17 AM   Result Value Ref Range    Glucose (POC) 352 (H) 65 - 100 mg/dL    Performed by Abram Blocker    GLUCOSE, POC    Collection Time: 11/15/20  4:45 PM   Result Value Ref Range    Glucose (POC) 315 (H) 65 - 100 mg/dL    Performed by Shivani To      .     Assessment:     Active Problems:    Pneumonia due to COVID-19 virus (11/13/2020)      Sepsis ([address] (11/13/2020)    Pap glycemia adjust Lantus and regular insulin  Insomnia add melatonin    Plan:     Continue with IV remdesivir and IV dexamethasone    Signed By: Delon Quintana MD     November 15, 2020

## 2020-11-15 NOTE — PROGRESS NOTES
Infectious Disease Progress Note           Subjective:   Continues to feel better, appetite is improving. Still not requiring O2 supplementation. Denies productive cough or N/V. Chest has resolved, reports ongoing chest pressure   Objective:   Physical Exam:     Visit Vitals  /81   Pulse 89   Temp 97.5 °F (36.4 °C)   Resp 26   Ht 5' 10\" (1.778 m)   Wt 110 kg (242 lb 8.1 oz)   SpO2 94%   BMI 34.80 kg/m²    O2 Flow Rate (L/min): 2 l/min O2 Device: Room air    Temp (24hrs), Av.1 °F (36.7 °C), Min:97.5 °F (36.4 °C), Max:98.8 °F (37.1 °C)    No intake/output data recorded. No intake/output data recorded. General: NAD, AAO x 4  HEENT: GIDEON, Moist mucosa   Lungs: Decreased at the bases, no wheeze/rhonchi   Heart: S1S2+, RRR, no murmur  Abdo: Soft, NT, ND, +BS   Exts: No edema, + pulses b/l   Skin: No wounds, No rashes or lesions    Data Review:       Recent Days:  Recent Labs     11/15/20  0602 20  2200   WBC 12.7* 28.4*   HGB 12.9 16.2   HCT 37.6 46.5    112*     Recent Labs     20  0602 20  2200   BUN 20 22*   CREA 0.77 1.48*     Microbiology   - Blood Cx : Neg     Diagnostics   CXR Results  (Last 48 hours)    None        Assessment/Plan     1. COVID-19 w pneumonitis: Tested positive as out pt on , Suspected Aspiration       Patchy b/l infiltrates on CT chest. Remains on RA, coughing less       Afebrile, WBC trending down on       On day # 3 of Remdesivir, IV steroid, Azithromycin and Zosyn       Routine labs in the morning     2. Sepsis due to # 1. WBC trending down on routine labs, afebrile       Blood Cx from  is neg. Continue current antibiotics      Routine labs in the morning      3. Large paraesophageal mass: Incidental finding on CT chest         ? Malignancy vs hernia, will need biopsy     Discussed w Dr Brittany Jiang and he recs CT abdo w oral and IV contrast      4. Acute renal failure: Cr 1.48, Cr down to 0.77 on     5.  Non-productive cough:  Likely due to # 1: Continue Symptomatic Tx     Jordi Robert MD    11/15/2020

## 2020-11-15 NOTE — CONSULTS
Gastroenterology Consult     Referring Physician: Cosme Bowen MD     Consult Date: 11/15/2020     Subjective:     Chief Complaint: Shortness of breath and nausea vomiting    History of Present Illness: Marisol Contreras is a 39 y.o. male who is seen in consultation for normal imaging of the chest.  Patient was admitted to the hospital with shortness of breath and nausea and vomiting. He has tested positive for Covid and has CAT scan findings consistent with pneumonia but there is also a paraesophageal mass however the exact nature is unknown if it is a paraesophageal hiatal hernia or a mass on its own. Patient appears to have lost some weight recently he does complain of cough and nausea with vomiting. 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 History reviewed. No pertinent family history.   Social History     Tobacco Use    Smoking status: Never Smoker    Smokeless tobacco: Never Used   Substance Use Topics    Alcohol use: Never     Frequency: Never      No Known Allergies  Current Facility-Administered Medications   Medication Dose Route Frequency    piperacillin-tazobactam (ZOSYN) 3.375 g in 0.9% sodium chloride (MBP/ADV) 100 mL MBP  3.375 g IntraVENous Q8H    enoxaparin (LOVENOX) injection 30 mg  30 mg SubCUTAneous Q12H    acetaminophen (TYLENOL) tablet 650 mg  650 mg Oral Q6H PRN    Or    acetaminophen (TYLENOL) suppository 650 mg  650 mg Rectal Q6H PRN    cholecalciferol (VITAMIN D3) (1000 Units /25 mcg) tablet 2 Tab  2,000 Units Oral DAILY    guaiFENesin-dextromethorphan (ROBITUSSIN DM) 100-10 mg/5 mL syrup 5 mL  5 mL Oral Q4H PRN    sodium chloride (NS) flush 5-40 mL  5-40 mL IntraVENous Q8H    sodium chloride (NS) flush 5-40 mL  5-40 mL IntraVENous PRN    bisacodyL (DULCOLAX) tablet 5 mg  5 mg Oral DAILY PRN    glucose chewable tablet 16 g  4 Tab Oral PRN    dextrose (D50W) injection syrg 12.5-25 g  25-50 mL IntraVENous PRN    glucagon (GLUCAGEN) injection 1 mg  1 mg IntraMUSCular PRN    insulin lispro (HUMALOG) injection   SubCUTAneous TIDAC    dexamethasone (DECADRON) 4 mg/mL injection 6 mg  6 mg IntraVENous Q6H    benzonatate (TESSALON) capsule 100 mg  100 mg Oral TID PRN    insulin glargine (LANTUS) injection 20 Units  20 Units SubCUTAneous DAILY    remdesivir 100 mg in 0.9% sodium chloride 250 mL IVPB  100 mg IntraVENous Q24H    azithromycin (ZITHROMAX) tablet 500 mg  500 mg Oral DAILY    0.9% sodium chloride infusion  125 mL/hr IntraVENous CONTINUOUS        Review of Systems:  A detailed 10 organ review of systems is obtained with pertinent positives as listed in the History of Present Illness and Past Medical History. All others are negative. Objective:     Physical Exam:  Visit Vitals  /81   Pulse 89   Temp 97.5 °F (36.4 °C)   Resp 26   Ht 5' 10\" (1.778 m)   Wt 110 kg (242 lb 8.1 oz)   SpO2 94%   BMI 34.80 kg/m²        Skin:  Extremities and face reveal no rashes. No valenzuela erythema. No telangiectasias on the chest wall. HEENT: Sclerae anicteric. Extra-occular muscles are intact. No oral ulcers. No abnormal pigmentation of the lips. The neck is supple. Cardiovascular: Regular rate and rhythm. No murmurs, gallops, or rubs. PMI nondisplaced. Carotids without bruits. Respiratory:  Comfortable breathing with no accessory muscle use. Clear breath sounds with no wheezes, rales, or rhonchi. GI:  Abdomen nondistended, soft, and nontender. Normal active bowel sounds. No enlargement of the liver or spleen. No masses palpable. Rectal:  Deferred  Musculoskeletal:  No pitting edema of the lower legs. Extremities have good range of motion. No costovertebral tenderness. Neurological:  Gross memory appears intact.   Patient is alert and oriented. Psychiatric:  Mood appears appropriate with judgement intact. Lymphatic:  No cervical or supraclavicular adenopathy. Lab/Data Review:  Recent Results (from the past 24 hour(s))   GLUCOSE, POC    Collection Time: 11/14/20  5:45 PM   Result Value Ref Range    Glucose (POC) 277 (H) 65 - 100 mg/dL    Performed by Marcelle Cotton    GLUCOSE, POC    Collection Time: 11/14/20 10:13 PM   Result Value Ref Range    Glucose (POC) 306 (H) 65 - 100 mg/dL    Performed by Marcelle Cotton    PROTHROMBIN TIME + INR    Collection Time: 11/15/20  6:02 AM   Result Value Ref Range    Prothrombin time 14.6 11.9 - 14.7 sec    INR 1.1 0.9 - 1.1     PTT    Collection Time: 11/15/20  6:02 AM   Result Value Ref Range    aPTT 55.2 (H) 23.0 - 35.7 sec    aPTT, therapeutic range   68 - 109 sec   FIBRINOGEN    Collection Time: 11/15/20  6:02 AM   Result Value Ref Range    Fibrinogen 992 (H) 220 - 535 mg/dL   D DIMER    Collection Time: 11/15/20  6:02 AM   Result Value Ref Range    D DIMER PENDING ug/ml(FEU)   CBC WITH AUTOMATED DIFF    Collection Time: 11/15/20  6:02 AM   Result Value Ref Range    WBC 12.7 (H) 4.1 - 11.1 K/uL    RBC 4.24 4.10 - 5.70 M/uL    HGB 12.9 12.1 - 17.0 g/dL    HCT 37.6 36.6 - 50.3 %    MCV 88.7 80.0 - 99.0 FL    MCH 30.4 26.0 - 34.0 PG    MCHC 34.3 30.0 - 36.5 g/dL    RDW 14.1 11.5 - 14.5 %    PLATELET 356 823 - 040 K/uL    MPV 12.1 8.9 - 12.9 FL    NEUTROPHILS 84 (H) 32 - 75 %    LYMPHOCYTES 8 (L) 12 - 49 %    MONOCYTES 7 5 - 13 %    EOSINOPHILS 0 0 - 7 %    BASOPHILS 0 0 - 1 %    IMMATURE GRANULOCYTES 1 (H) 0.0 - 0.5 %    ABS. NEUTROPHILS 10.7 (H) 1.8 - 8.0 K/UL    ABS. LYMPHOCYTES 1.1 0.8 - 3.5 K/UL    ABS. MONOCYTES 0.9 0.0 - 1.0 K/UL    ABS. EOSINOPHILS 0.0 0.0 - 0.4 K/UL    ABS. BASOPHILS 0.0 0.0 - 0.1 K/UL    ABS. IMM.  GRANS. 0.1 (H) 0.00 - 0.04 K/UL    DF AUTOMATED     GLUCOSE, POC    Collection Time: 11/15/20  7:43 AM   Result Value Ref Range    Glucose (POC) 297 (H) 65 - 100 mg/dL    Performed by NIKI MIA    GLUCOSE, POC    Collection Time: 11/15/20 11:17 AM   Result Value Ref Range    Glucose (POC) 352 (H) 65 - 100 mg/dL    Performed by Guadalupe DE LA PAZ         XR CHEST SNGL V   Final Result   Addendum 1 of 1   Addendum: Addendum/   impression:      Infrahilar disease on the right corresponds to mass seen on CT exam    reported   separately. Please see that report for further assessment pulmonary    findings. Final      CT CHEST WO CONT   Final Result   Impression:      1. 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 Follow-up   contrast enhanced CT of the chest recommended. 2.  Patchy bilateral airspace disease in both lungs consistent with reported   history of atypical viral infection/COVID pneumonia. 3.  Diffusely thickened esophageal wall of uncertain etiology and significance. 4.  Coronary atherosclerosis. 5.  Additional findings as above. Assessment/Plan:     Active Problems:    Pneumonia due to COVID-19 virus (11/13/2020)      Sepsis ([address] (11/13/2020)    Abnormal imaging of the chest I would recommend a CAT scan of the chest with p.o. and IV contrast to look at the exact nature of paraesophageal mass and depending upon findings we will either do an upper endoscopy or endoscopic ultrasound. I concur with management and treatment for his Covid pneumonia.   His endoscopic evaluation can wait till his pneumonia gets better    IP CONSULT TO PULMONOLOGY  IP CONSULT TO INFECTIOUS DISEASES  IP CONSULT TO ONCOLOGY  IP CONSULT TO GASTROENTEROLOGY    Thank you for allowing me to participate in this patients care  Cc Referring Physician   Other, MD Cosme

## 2020-11-16 LAB
APTT PPP: 49.1 SEC (ref 23–35.7)
BACTERIA SPEC CULT: NORMAL
BASOPHILS # BLD: 0 K/UL (ref 0–0.1)
BASOPHILS NFR BLD: 0 % (ref 0–1)
D DIMER PPP FEU-MCNC: 1.76 UG/ML(FEU)
DIFFERENTIAL METHOD BLD: ABNORMAL
EOSINOPHIL # BLD: 0 K/UL (ref 0–0.4)
EOSINOPHIL NFR BLD: 0 % (ref 0–7)
ERYTHROCYTE [DISTWIDTH] IN BLOOD BY AUTOMATED COUNT: 14.2 % (ref 11.5–14.5)
FIBRINOGEN PPP-MCNC: 743 MG/DL (ref 220–535)
GLUCOSE BLD STRIP.AUTO-MCNC: 292 MG/DL (ref 65–100)
GLUCOSE BLD STRIP.AUTO-MCNC: 293 MG/DL (ref 65–100)
GLUCOSE BLD STRIP.AUTO-MCNC: 328 MG/DL (ref 65–100)
GLUCOSE BLD STRIP.AUTO-MCNC: 399 MG/DL (ref 65–100)
GRAM STN SPEC: NORMAL
HCT VFR BLD AUTO: 36.7 % (ref 36.6–50.3)
HGB BLD-MCNC: 12.7 G/DL (ref 12.1–17)
IMM GRANULOCYTES # BLD AUTO: 0 K/UL
IMM GRANULOCYTES NFR BLD AUTO: 0 %
INR PPP: 1.2 (ref 0.9–1.1)
LDH SERPL L TO P-CCNC: 284 U/L (ref 85–241)
LYMPHOCYTES # BLD: 1.1 K/UL (ref 0.8–3.5)
LYMPHOCYTES NFR BLD: 11 % (ref 12–49)
MCH RBC QN AUTO: 30.5 PG (ref 26–34)
MCHC RBC AUTO-ENTMCNC: 34.6 G/DL (ref 30–36.5)
MCV RBC AUTO: 88 FL (ref 80–99)
MONOCYTES # BLD: 1.1 K/UL (ref 0–1)
MONOCYTES NFR BLD: 11 % (ref 5–13)
NEUTS BAND NFR BLD MANUAL: 2 % (ref 0–6)
NEUTS SEG # BLD: 8.2 K/UL (ref 1.8–8)
NEUTS SEG NFR BLD: 76 % (ref 32–75)
PERFORMED BY, TECHID: ABNORMAL
PLATELET # BLD AUTO: 252 K/UL (ref 150–400)
PMV BLD AUTO: 11.6 FL (ref 8.9–12.9)
PROTHROMBIN TIME: 15.1 SEC (ref 11.9–14.7)
RBC # BLD AUTO: 4.17 M/UL (ref 4.1–5.7)
RBC MORPH BLD: ABNORMAL
SARS-COV-2, COV2NT: NOT DETECTED
SPECIAL REQUESTS,SREQ: NORMAL
SPECIAL REQUESTS,SREQ: NORMAL
THERAPEUTIC RANGE,PTTT: ABNORMAL SEC (ref 68–109)
WBC # BLD AUTO: 10.4 K/UL (ref 4.1–11.1)

## 2020-11-16 PROCEDURE — 99232 SBSQ HOSP IP/OBS MODERATE 35: CPT | Performed by: INTERNAL MEDICINE

## 2020-11-16 PROCEDURE — 74011250636 HC RX REV CODE- 250/636: Performed by: INTERNAL MEDICINE

## 2020-11-16 PROCEDURE — 85379 FIBRIN DEGRADATION QUANT: CPT

## 2020-11-16 PROCEDURE — 74011000258 HC RX REV CODE- 258: Performed by: INTERNAL MEDICINE

## 2020-11-16 PROCEDURE — 85730 THROMBOPLASTIN TIME PARTIAL: CPT

## 2020-11-16 PROCEDURE — 74011636637 HC RX REV CODE- 636/637: Performed by: INTERNAL MEDICINE

## 2020-11-16 PROCEDURE — 74011000250 HC RX REV CODE- 250: Performed by: INTERNAL MEDICINE

## 2020-11-16 PROCEDURE — 36415 COLL VENOUS BLD VENIPUNCTURE: CPT

## 2020-11-16 PROCEDURE — 85025 COMPLETE CBC W/AUTO DIFF WBC: CPT

## 2020-11-16 PROCEDURE — 82962 GLUCOSE BLOOD TEST: CPT

## 2020-11-16 PROCEDURE — 65270000029 HC RM PRIVATE

## 2020-11-16 PROCEDURE — 85384 FIBRINOGEN ACTIVITY: CPT

## 2020-11-16 PROCEDURE — 83036 HEMOGLOBIN GLYCOSYLATED A1C: CPT

## 2020-11-16 PROCEDURE — 74011250637 HC RX REV CODE- 250/637: Performed by: INTERNAL MEDICINE

## 2020-11-16 PROCEDURE — 85610 PROTHROMBIN TIME: CPT

## 2020-11-16 PROCEDURE — 83615 LACTATE (LD) (LDH) ENZYME: CPT

## 2020-11-16 RX ADMIN — Medication 10 ML: at 21:03

## 2020-11-16 RX ADMIN — ENOXAPARIN SODIUM 30 MG: 100 INJECTION SUBCUTANEOUS at 21:03

## 2020-11-16 RX ADMIN — DEXAMETHASONE SODIUM PHOSPHATE 6 MG: 4 INJECTION, SOLUTION INTRA-ARTICULAR; INTRALESIONAL; INTRAMUSCULAR; INTRAVENOUS; SOFT TISSUE at 00:02

## 2020-11-16 RX ADMIN — ENOXAPARIN SODIUM 30 MG: 100 INJECTION SUBCUTANEOUS at 09:11

## 2020-11-16 RX ADMIN — DEXAMETHASONE SODIUM PHOSPHATE 6 MG: 4 INJECTION, SOLUTION INTRA-ARTICULAR; INTRALESIONAL; INTRAMUSCULAR; INTRAVENOUS; SOFT TISSUE at 17:17

## 2020-11-16 RX ADMIN — INSULIN LISPRO 5 UNITS: 100 INJECTION, SOLUTION INTRAVENOUS; SUBCUTANEOUS at 16:32

## 2020-11-16 RX ADMIN — Medication 10 ML: at 14:38

## 2020-11-16 RX ADMIN — DEXAMETHASONE SODIUM PHOSPHATE 6 MG: 4 INJECTION, SOLUTION INTRA-ARTICULAR; INTRALESIONAL; INTRAMUSCULAR; INTRAVENOUS; SOFT TISSUE at 12:44

## 2020-11-16 RX ADMIN — MELATONIN TAB 5 MG 5 MG: 5 TAB at 21:03

## 2020-11-16 RX ADMIN — INSULIN LISPRO 5 UNITS: 100 INJECTION, SOLUTION INTRAVENOUS; SUBCUTANEOUS at 07:37

## 2020-11-16 RX ADMIN — INSULIN GLARGINE 30 UNITS: 100 INJECTION, SOLUTION SUBCUTANEOUS at 09:10

## 2020-11-16 RX ADMIN — AZITHROMYCIN MONOHYDRATE 500 MG: 500 TABLET ORAL at 09:11

## 2020-11-16 RX ADMIN — SODIUM CHLORIDE 125 ML/HR: 9 INJECTION, SOLUTION INTRAVENOUS at 15:57

## 2020-11-16 RX ADMIN — DEXAMETHASONE SODIUM PHOSPHATE 6 MG: 4 INJECTION, SOLUTION INTRA-ARTICULAR; INTRALESIONAL; INTRAMUSCULAR; INTRAVENOUS; SOFT TISSUE at 06:00

## 2020-11-16 RX ADMIN — Medication 2 TABLET: at 09:11

## 2020-11-16 RX ADMIN — PIPERACILLIN SODIUM AND TAZOBACTAM SODIUM 3.38 G: 3; .375 INJECTION, POWDER, LYOPHILIZED, FOR SOLUTION INTRAVENOUS at 09:10

## 2020-11-16 RX ADMIN — SODIUM CHLORIDE 100 MG: 9 INJECTION, SOLUTION INTRAVENOUS at 14:47

## 2020-11-16 RX ADMIN — DEXAMETHASONE SODIUM PHOSPHATE 6 MG: 4 INJECTION, SOLUTION INTRA-ARTICULAR; INTRALESIONAL; INTRAMUSCULAR; INTRAVENOUS; SOFT TISSUE at 23:50

## 2020-11-16 RX ADMIN — INSULIN LISPRO 5 UNITS: 100 INJECTION, SOLUTION INTRAVENOUS; SUBCUTANEOUS at 12:44

## 2020-11-16 RX ADMIN — PIPERACILLIN SODIUM AND TAZOBACTAM SODIUM 3.38 G: 3; .375 INJECTION, POWDER, LYOPHILIZED, FOR SOLUTION INTRAVENOUS at 01:16

## 2020-11-16 NOTE — ROUTINE PROCESS
Bedside shift change report given to Zena Montes (oncoming nurse) by Camila Mcbride (offgoing nurse). Report included the following information SBAR, Kardex, Intake/Output, MAR and Recent Results.

## 2020-11-16 NOTE — PROGRESS NOTES
Infectious Disease Progress Note           Subjective:   Doing well, denies new complaints. Continues to feel better. Breathing improved, no increased O2 requirements   Objective:   Physical Exam:     Visit Vitals  /73   Pulse 76   Temp 97.8 °F (36.6 °C)   Resp 20   Ht 5' 10\" (1.778 m)   Wt 110 kg (242 lb 8.1 oz)   SpO2 94%   BMI 34.80 kg/m²    O2 Flow Rate (L/min): 2 l/min O2 Device: Room air    Temp (24hrs), Av.2 °F (36.8 °C), Min:97.8 °F (36.6 °C), Max:98.4 °F (36.9 °C)    No intake/output data recorded. No intake/output data recorded. General: NAD, AAO x 4  HEENT: GIDEON, Moist mucosa   Lungs: Decreased at the bases, no wheeze/rhonchi   Heart: S1S2+, RRR, no murmur  Abdo: Soft, NT, ND, +BS   Exts: No edema, + pulses b/l   Skin: No wounds, No rashes or lesions    Data Review:       Recent Days:  Recent Labs     20  0410 11/15/20  0602   WBC 10.4 12.7*   HGB 12.7 12.9   HCT 36.7 37.6    211     Recent Labs     20  0602   BUN 20   CREA 0.77     Microbiology   - Blood Cx : Neg     Diagnostics   CXR Results  (Last 48 hours)    None        CT chest/abdo/pel 11/15: Homogeneous, low-density right paraesophageal mass as seen on prior CT. 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 This could be further evaluated with endoscopic ultrasound. Persistent perihilar and infrahilar airspace opacities with additional peripheral groundglass opacities. While these imaging features can be seen with Covid-19 pneumonia, they are nonspecific and can occur with a variety of infectious and noninfectious processes. Assessment/Plan     1.  COVID-19 w pneumonitis: Tested positive as out pt on       Patchy b/l infiltrates on CT chest. Remains on RA      WBC continues to improve, afebrile       On day # 4 of Remdesivir, IV steroid, Azithromycin and Zosyn       Routine labs in the morning, will d/c Zosyn, continue Azithromycin       Routine labs in the morning     2. Large paraesophageal mass: Incidental finding on CT chest         Mass appears cystic on CT, endoscopic US recommended      3. Acute renal failure: Cr 1.48, Cr down to 0.77 on 11/14    4. Non-productive cough: Likely due to # 1: Continue Symptomatic Tx     5. Diarrhea: likely antibiotic associated, doubt C. Diff     6.  DM: Recently diagnosed, will check A1C     Abdiel Cuevas MD    11/16/2020

## 2020-11-16 NOTE — PROGRESS NOTES
Hematology/Oncology   Progress Note    Patient: Ruthy Harrington MRN: [id number]     YOB: 1975  Age: 39 y.o. Sex: male      Admit Date: 11/12/2020    LOS: 3 days     Chief Complaint: Admitted with worsening shortness of breath in the setting of recent COVID infection. I did not examine the patient in his room. I did talk to him on the phone and also discussed his care with his nurse.         Subjective:     Denies dysphagia. Feels a whole lot better everyday. No new complaints. . Breathing has improved. No fever today. Had ct scans last night with contrast to eval his esophageal mass. Constitutional No fevers, chills, night sweats, excessive fatigue or weight loss. Allergic/Immunologic No recent allergic reactions   Eyes No significant visual difficulties. No diplopia. ENMT No problems with hearing, no sore throat, no sinus drainage. Endocrine No hot flashes or night sweats. No cold intolerance, polyuria, or polydipsia   Hematologic/Lymphatic No easy bruising or bleeding. The patient denies any tender or palpable lymph nodes   Breasts No abnormal masses of breast, nipple discharge or pain. Respiratory No dyspnea on exertion, orthopnea, chest pain, cough or hemoptysis. Cardiovascular No anginal chest pain, irregular heart beat, tachycardia, palpitations or orthopnea. Gastrointestinal No nausea, vomiting, diarrhea, constipation, cramping, dysphagia, reflux, heartburn, GI bleeding, or early satiety. No change in bowel habits. Genitourinary (M) No hematuria, dysuria, increased frequency, urgency, hesitancy or incontinence. Musculoskeletal No joint pain, swelling or redness. No decreased range of motion. Integumentary No chronic rashes, inflammation, ulcerations, pruritus, petechiae, purpura, ecchymoses, or skin changes. Neurologic No headache, blurred vision, and no areas of focal weakness or numbness. Normal gait. No sensory problems.    Psychiatric No insomnia, depression, ben or mood swings. No psychotropic drugs.         Current Facility-Administered Medications:     melatonin tablet 5 mg, 5 mg, Oral, QHS, Reynold Thornton MD, 5 mg at 11/15/20 2156    insulin glargine (LANTUS) injection 30 Units, 30 Units, SubCUTAneous, DAILY, Reynold Thornton MD, 30 Units at 11/16/20 0910    insulin lispro (HUMALOG) injection 5 Units, 5 Units, SubCUTAneous, TIDAC, Reynold Thornton MD, 5 Units at 11/16/20 1244    enoxaparin (LOVENOX) injection 30 mg, 30 mg, SubCUTAneous, Q12H, Reynold Thornton MD, 30 mg at 11/16/20 0911    acetaminophen (TYLENOL) tablet 650 mg, 650 mg, Oral, Q6H PRN, 650 mg at 11/13/20 2343 **OR** acetaminophen (TYLENOL) suppository 650 mg, 650 mg, Rectal, Q6H PRN, Erin ERAZO MD    cholecalciferol (VITAMIN D3) (1000 Units /25 mcg) tablet 2 Tab, 2,000 Units, Oral, DAILY, Erin ERAZO MD, 2 Tab at 11/16/20 0911    guaiFENesin-dextromethorphan (ROBITUSSIN DM) 100-10 mg/5 mL syrup 5 mL, 5 mL, Oral, Q4H PRN, Erin ERAZO MD    sodium chloride (NS) flush 5-40 mL, 5-40 mL, IntraVENous, Q8H, Reynold Thornton MD, 10 mL at 11/16/20 1438    sodium chloride (NS) flush 5-40 mL, 5-40 mL, IntraVENous, PRN, Reynold Anguiano MD    bisacodyL (DULCOLAX) tablet 5 mg, 5 mg, Oral, DAILY PRN, Erin ERAZO MD    glucose chewable tablet 16 g, 4 Tab, Oral, PRN, Reynold Anguiano MD    dextrose (D50W) injection syrg 12.5-25 g, 25-50 mL, IntraVENous, PRN, Reynold Thornton MD    glucagon (GLUCAGEN) injection 1 mg, 1 mg, IntraMUSCular, PRN, Reynold Anguiano MD    dexamethasone (DECADRON) 4 mg/mL injection 6 mg, 6 mg, IntraVENous, Q6H, David Mcelroy MD, 6 mg at 11/16/20 1244    benzonatate (TESSALON) capsule 100 mg, 100 mg, Oral, TID PRN, Preet Reeves MD, 100 mg at 11/13/20 2343    remdesivir 100 mg in 0.9% sodium chloride 250 mL IVPB, 100 mg, IntraVENous, Q24H, Preet Reeves MD, 100 mg at 11/15/20 1215    azithromycin (ZITHROMAX) tablet 500 mg, 500 mg, Oral, DAILY, Reynold Thornton MD, 500 mg at 11/16/20 0911    0.9% sodium chloride infusion, 125 mL/hr, IntraVENous, CONTINUOUS, Sherren Muscat I, MD, Last Rate: 125 mL/hr at 11/13/20 0609, 125 mL/hr at 11/13/20 0609     Objective:     Vitals:    11/16/20 0730 11/16/20 0735 11/16/20 1240 11/16/20 1434   BP: 105/73  112/74 122/77   Pulse: 76  87 77   Resp: 20 20 20   Temp:  97.8 °F (36.6 °C) 98.4 °F (36.9 °C) 98.5 °F (36.9 °C)   SpO2: 94%  92% 91%   Weight:       Height:              Physical Exam:   Not examined in person. He is awake, alert and oriented and showed good comprehension of his care and asked appropriate questions on the phone. Lab/Data Review:  Recent Labs     11/16/20  0410 11/15/20  0602   WBC 10.4 12.7*   HGB 12.7 12.9   HCT 36.7 37.6    211     Recent Labs     11/16/20  0410 11/15/20  0602 11/14/20  0602   NA  --   --  135*   K  --   --  3.7   CL  --   --  101   CO2  --   --  25   GLU  --   --  301*   BUN  --   --  20   CREA  --   --  0.77   CA  --   --  8.4*   INR 1.2* 1.1 1.1     No results for input(s): PH, PCO2, PO2, HCO3, FIO2 in the last 72 hours.   Recent Results (from the past 24 hour(s))   GLUCOSE, POC    Collection Time: 11/15/20  4:45 PM   Result Value Ref Range    Glucose (POC) 315 (H) 65 - 100 mg/dL    Performed by Jerilyn Carbajal    GLUCOSE, POC    Collection Time: 11/15/20  9:20 PM   Result Value Ref Range    Glucose (POC) 360 (H) 65 - 100 mg/dL    Performed by Jerilyn Carbajal    PROTHROMBIN TIME + INR    Collection Time: 11/16/20  4:10 AM   Result Value Ref Range    Prothrombin time 15.1 (H) 11.9 - 14.7 sec    INR 1.2 (H) 0.9 - 1.1     PTT    Collection Time: 11/16/20  4:10 AM   Result Value Ref Range    aPTT 49.1 (H) 23.0 - 35.7 sec    aPTT, therapeutic range   68 - 109 sec   FIBRINOGEN    Collection Time: 11/16/20  4:10 AM   Result Value Ref Range    Fibrinogen 743 (H) 220 - 535 mg/dL   D DIMER    Collection Time: 11/16/20  4:10 AM   Result Value Ref Range    D DIMER 1.76 (H) <0.50 ug/ml(FEU)   LD    Collection Time: 11/16/20  4:10 AM   Result Value Ref Range     (H) 85 - 241 U/L   CBC WITH AUTOMATED DIFF    Collection Time: 11/16/20  4:10 AM   Result Value Ref Range    WBC 10.4 4.1 - 11.1 K/uL    RBC 4.17 4.10 - 5.70 M/uL    HGB 12.7 12.1 - 17.0 g/dL    HCT 36.7 36.6 - 50.3 %    MCV 88.0 80.0 - 99.0 FL    MCH 30.5 26.0 - 34.0 PG    MCHC 34.6 30.0 - 36.5 g/dL    RDW 14.2 11.5 - 14.5 %    PLATELET 875 011 - 105 K/uL    MPV 11.6 8.9 - 12.9 FL    NEUTROPHILS 76 (H) 32 - 75 %    BAND NEUTROPHILS 2 0 - 6 %    LYMPHOCYTES 11 (L) 12 - 49 %    MONOCYTES 11 5 - 13 %    EOSINOPHILS 0 0 - 7 %    BASOPHILS 0 0 - 1 %    IMMATURE GRANULOCYTES 0 %    ABS. NEUTROPHILS 8.2 (H) 1.8 - 8.0 K/UL    ABS. LYMPHOCYTES 1.1 0.8 - 3.5 K/UL    ABS. MONOCYTES 1.1 (H) 0.0 - 1.0 K/UL    ABS. EOSINOPHILS 0.0 0.0 - 0.4 K/UL    ABS. BASOPHILS 0.0 0.0 - 0.1 K/UL    ABS. IMM. GRANS. 0.0 K/UL    DF Manual      RBC COMMENTS Normocytic, Normochromic     GLUCOSE, POC    Collection Time: 11/16/20  7:25 AM   Result Value Ref Range    Glucose (POC) 292 (H) 65 - 100 mg/dL    Performed by [address] [place] [address], POC    Collection Time: 11/16/20 12:04 PM   Result Value Ref Range    Glucose (POC) 293 (H) 65 - 100 mg/dL    Performed by [facility]         Radiology:   CT Results  (Last 48 hours)               11/15/20 2048  CT ABD W CONT Final result    Impression:  IMPRESSION:   1. Homogeneous, low-density right paraesophageal mass as seen on prior CT. 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 This could be   further evaluated with endoscopic ultrasound. 2. Persistent perihilar and infrahilar airspace opacities with additional   peripheral groundglass opacities. While these imaging features can be seen with   Covid-19 pneumonia, they are nonspecific and can occur with a variety of   infectious and noninfectious processes. 3. Hepatic steatosis.    4. Nonspecific, mildly enlarged right paraesophageal lymph node. Narrative:  Examination:   1. CT chest with contrast.   2. CT abdomen with contrast       HISTORY: Mediastinal mass. Technique: Transaxial computed tomographic images of the chest and abdomen were   obtained following the uneventful administration of 100 mL Isovue 370   intravenous contrast. The patient received oral contrast prior to the study. Coronal and sagittal reconstructions were created. Axial MIP images of the chest   were created. Dose Reduction: All CT scans at this facility are performed using dose reduction   optimization techniques as appropriate to a performed exam including the   following: Automated exposure control, adjustments of the mA and/or kV according   to patient size, or use of iterative reconstruction technique. COMPARISON: Chest CT dated 11/12/2020       FINDINGS:       CHEST: There is a mildly enlarged low right paratracheal lymph node, which   measures 11 mm in short axis. The coronary arteries are atherosclerotic. There   is no pericardial effusion. The thoracic aorta is mildly atherosclerotic, but   normal in course and caliber. Again seen is a low-attenuation lesion along the right aspect of the distal   esophagus, posterior to the right inferior pulmonary vein, which measures 7.0 cm   x 6.9 cm x 8.5 cm. This lesion has an average Hounsfield unit of 30. It is   homogeneous in appearance. There are persistent patchy perihilar and infrahilar airspace opacities. There   are a few additional peripheral groundglass opacities in the upper lobes. In   there is mild atelectasis in the right lung adjacent to the paraesophageal mass. There is mild left basilar atelectasis. There is a small left pleural effusion. There is no pneumothorax. Abdomen: There is hypoattenuation of the liver, suggestive of hepatic steatosis. There is no focal hepatic lesion. There is no biliary duct dilation.  No calcified stones in the gallbladder. There is fatty atrophy of the pancreas. The   spleen and adrenal glands are normal. The kidneys enhance symmetrically. There   is no hydronephrosis. There are no dilated loops of bowel. The appendix is   normal. There is a fat-containing umbilical hernia. The abdominal aorta and   iliac arteries are atherosclerotic. There are scattered subcentimeter nodes, but   no enlarged lymph nodes by CT size criteria. There is multilevel degenerative disc disease. No suspicious osseous lesions. 11/15/20 2048  CT CHEST W CONT Final result    Impression:  IMPRESSION:   1. Homogeneous, low-density right paraesophageal mass as seen on prior CT. 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 This could be   further evaluated with endoscopic ultrasound. 2. Persistent perihilar and infrahilar airspace opacities with additional   peripheral groundglass opacities. While these imaging features can be seen with   Covid-19 pneumonia, they are nonspecific and can occur with a variety of   infectious and noninfectious processes. 3. Hepatic steatosis. 4. Nonspecific, mildly enlarged right paraesophageal lymph node. Narrative:  Examination:   1. CT chest with contrast.   2. CT abdomen with contrast       HISTORY: Mediastinal mass. Technique: Transaxial computed tomographic images of the chest and abdomen were   obtained following the uneventful administration of 100 mL Isovue 370   intravenous contrast. The patient received oral contrast prior to the study. Coronal and sagittal reconstructions were created. Axial MIP images of the chest   were created.    Dose Reduction: All CT scans at this facility are performed using dose reduction   optimization techniques as appropriate to a performed exam including the   following: Automated exposure control, adjustments of the mA and/or kV according   to patient size, or use of iterative reconstruction technique. COMPARISON: Chest CT dated 11/12/2020       FINDINGS:       CHEST: There is a mildly enlarged low right paratracheal lymph node, which   measures 11 mm in short axis. The coronary arteries are atherosclerotic. There   is no pericardial effusion. The thoracic aorta is mildly atherosclerotic, but   normal in course and caliber. Again seen is a low-attenuation lesion along the right aspect of the distal   esophagus, posterior to the right inferior pulmonary vein, which measures 7.0 cm   x 6.9 cm x 8.5 cm. This lesion has an average Hounsfield unit of 30. It is   homogeneous in appearance. There are persistent patchy perihilar and infrahilar airspace opacities. There   are a few additional peripheral groundglass opacities in the upper lobes. In   there is mild atelectasis in the right lung adjacent to the paraesophageal mass. There is mild left basilar atelectasis. There is a small left pleural effusion. There is no pneumothorax. Abdomen: There is hypoattenuation of the liver, suggestive of hepatic steatosis. There is no focal hepatic lesion. There is no biliary duct dilation. No   calcified stones in the gallbladder. There is fatty atrophy of the pancreas. The   spleen and adrenal glands are normal. The kidneys enhance symmetrically. There   is no hydronephrosis. There are no dilated loops of bowel. The appendix is   normal. There is a fat-containing umbilical hernia. The abdominal aorta and   iliac arteries are atherosclerotic. There are scattered subcentimeter nodes, but   no enlarged lymph nodes by CT size criteria. There is multilevel degenerative disc disease. No suspicious osseous lesions. Assessment and Plan: Active Problems:    Pneumonia due to COVID-19 virus (11/13/2020)      Sepsis ([address] (11/13/2020)    Paraesophageal Mass;  - Large lower thorax/mediastinal mass noted on CT. CT with contrast done last night is reviewed.  This mass measures 7x 6.9. 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 However Direct visualisation with enodscopic USG is recommended.   -non specific mildly enlarged paraesophageal node is noted. This can be eval by EUS and biopsy as well. - Differential includes Congenital duplication cyst favored, less likely  lymphoma, primary esophageal.  - Seen by Dr. Hillary Nicole who recommended EUS /endoscopic evaluation after covid 19 pneumonia is resolved. - Discussed ct findings with patient on his phone in detail and answered all his questions to his apparent satisfaction. Leukocytosis:  - Likely due to COVID pneumonitis. - Management per primary team.  - Much improved today , infact normal.      Thrombocytopenia:  - resolved. Normal today.     DVT Prophylaxis:  - Consider starting prophylactic anticoagulation after invasive procedures are completed.       Signed By: Deb Ham MD     November 16, 2020

## 2020-11-16 NOTE — PROGRESS NOTES
Pulmonology and Critical Care Progress Note    Subjective:     Chief Complaint:   Chief Complaint   Patient presents with    Chest Pain          Patient seen and examined in his room this afternoon. No acute events overnight. I discussed the case in detail with the patient at the bedside. His repeat COVID-19 test is negative. Lying in bed comfortably  Awake and alert  On room air now and saturating between 91 and 95%. Patient admits that his respiratory status is well controlled. No acute distress      Review of Systems:  A comprehensive review of systems was negative except for that written in the HPI.     Current Facility-Administered Medications   Medication Dose Route Frequency Provider Last Rate Last Dose    melatonin tablet 5 mg  5 mg Oral QHS Sherren Muscat I, MD   5 mg at 11/15/20 2156    insulin glargine (LANTUS) injection 30 Units  30 Units SubCUTAneous DAILY Sherren Muscat I, MD   30 Units at 11/16/20 0910    insulin lispro (HUMALOG) injection 5 Units  5 Units SubCUTAneous TIDAC Sherren Muscat I, MD   5 Units at 11/16/20 1244    enoxaparin (LOVENOX) injection 30 mg  30 mg SubCUTAneous Q12H Sherren Muscat I, MD   30 mg at 11/16/20 0911    acetaminophen (TYLENOL) tablet 650 mg  650 mg Oral Q6H PRN Sherren Muscat I, MD   650 mg at 11/13/20 2343    Or    acetaminophen (TYLENOL) suppository 650 mg  650 mg Rectal Q6H PRN Alesia Howe MD        cholecalciferol (VITAMIN D3) (1000 Units /25 mcg) tablet 2 Tab  2,000 Units Oral DAILY Alesia Howe MD   2 Tab at 11/16/20 0911    guaiFENesin-dextromethorphan (ROBITUSSIN DM) 100-10 mg/5 mL syrup 5 mL  5 mL Oral Q4H PRN Sherren Muscat I, MD        sodium chloride (NS) flush 5-40 mL  5-40 mL IntraVENous Q8H Reynold Thornton MD   10 mL at 11/16/20 1438    sodium chloride (NS) flush 5-40 mL  5-40 mL IntraVENous PRN Sherren Muscat I, MD        bisacodyL (DULCOLAX) tablet 5 mg  5 mg Oral DAILY PRN Alseia Howe MD        glucose chewable tablet 16 g  4 Tab Oral PRN Candace ERAZO MD        dextrose (D50W) injection syrg 12.5-25 g  25-50 mL IntraVENous PRN Candace ERAZO MD        glucagon (GLUCAGEN) injection 1 mg  1 mg IntraMUSCular PRN Candace ERAZO MD        dexamethasone (DECADRON) 4 mg/mL injection 6 mg  6 mg IntraVENous Q6H Mia Marcelo MD   6 mg at 20 1244    benzonatate (TESSALON) capsule 100 mg  100 mg Oral TID PRN Monico Hwang MD   100 mg at 20 2343    remdesivir 100 mg in 0.9% sodium chloride 250 mL IVPB  100 mg IntraVENous Q24H Preet Reeves MD   100 mg at 20 1447    azithromycin (ZITHROMAX) tablet 500 mg  500 mg Oral DAILY Candace ERAZO MD   500 mg at 20 0911    0.9% sodium chloride infusion  125 mL/hr IntraVENous CONTINUOUS Candace ERAZO  mL/hr at 20 1557 125 mL/hr at 20 1557            No Known Allergies        Objective:     Blood pressure 122/77, pulse 77, temperature 98.5 °F (36.9 °C), resp. rate 20, height 5' 10\" (1.778 m), weight 110 kg (242 lb 8.1 oz), SpO2 91 %. Temp (24hrs), Av.3 °F (36.8 °C), Min:97.8 °F (36.6 °C), Max:98.5 °F (36.9 °C)      Intake and Output:  Current Shift: No intake/output data recorded. Last 3 Shifts: No intake/output data recorded. Physical Exam:    General: Lying in bed comfortably, no acute distress  Eye: Reactive, symmetric  Throat and Neck: Supple  Lung: Reduced air entry bilaterally with prolonged exhalation but no wheezing. Occasional crackles. On room air. Heart: S1+S2. No murmurs  Abdomen: soft, non-tender. Bowel sounds normal. No masses; obese  Extremities: No edema  : Not done  Skin: No cyanosis  Neurologic: A & O x3.   Grossly nonfocal  Psychiatric: Appropriate affect; coherent    Lab/Data Review:  Recent Results (from the past 24 hour(s))   GLUCOSE, POC    Collection Time: 11/15/20  4:45 PM   Result Value Ref Range    Glucose (POC) 315 (H) 65 - 100 mg/dL    Performed by Louisa Joe    GLUCOSE, POC    Collection Time: 11/15/20  9:20 PM   Result Value Ref Range    Glucose (POC) 360 (H) 65 - 100 mg/dL    Performed by Karen Baig    PROTHROMBIN TIME + INR    Collection Time: 11/16/20  4:10 AM   Result Value Ref Range    Prothrombin time 15.1 (H) 11.9 - 14.7 sec    INR 1.2 (H) 0.9 - 1.1     PTT    Collection Time: 11/16/20  4:10 AM   Result Value Ref Range    aPTT 49.1 (H) 23.0 - 35.7 sec    aPTT, therapeutic range   68 - 109 sec   FIBRINOGEN    Collection Time: 11/16/20  4:10 AM   Result Value Ref Range    Fibrinogen 743 (H) 220 - 535 mg/dL   D DIMER    Collection Time: 11/16/20  4:10 AM   Result Value Ref Range    D DIMER 1.76 (H) <0.50 ug/ml(FEU)   LD    Collection Time: 11/16/20  4:10 AM   Result Value Ref Range     (H) 85 - 241 U/L   CBC WITH AUTOMATED DIFF    Collection Time: 11/16/20  4:10 AM   Result Value Ref Range    WBC 10.4 4.1 - 11.1 K/uL    RBC 4.17 4.10 - 5.70 M/uL    HGB 12.7 12.1 - 17.0 g/dL    HCT 36.7 36.6 - 50.3 %    MCV 88.0 80.0 - 99.0 FL    MCH 30.5 26.0 - 34.0 PG    MCHC 34.6 30.0 - 36.5 g/dL    RDW 14.2 11.5 - 14.5 %    PLATELET 565 511 - 441 K/uL    MPV 11.6 8.9 - 12.9 FL    NEUTROPHILS 76 (H) 32 - 75 %    BAND NEUTROPHILS 2 0 - 6 %    LYMPHOCYTES 11 (L) 12 - 49 %    MONOCYTES 11 5 - 13 %    EOSINOPHILS 0 0 - 7 %    BASOPHILS 0 0 - 1 %    IMMATURE GRANULOCYTES 0 %    ABS. NEUTROPHILS 8.2 (H) 1.8 - 8.0 K/UL    ABS. LYMPHOCYTES 1.1 0.8 - 3.5 K/UL    ABS. MONOCYTES 1.1 (H) 0.0 - 1.0 K/UL    ABS. EOSINOPHILS 0.0 0.0 - 0.4 K/UL    ABS. BASOPHILS 0.0 0.0 - 0.1 K/UL    ABS. IMM.  GRANS. 0.0 K/UL    DF Manual      RBC COMMENTS Normocytic, Normochromic     GLUCOSE, POC    Collection Time: 11/16/20  7:25 AM   Result Value Ref Range    Glucose (POC) 292 (H) 65 - 100 mg/dL    Performed by [address] [place] [address], POC    Collection Time: 11/16/20 12:04 PM   Result Value Ref Range    Glucose (POC) 293 (H) 65 - 100 mg/dL    Performed by [facility] PARESH      chest X-ray      CT ABD W CONT Final Result   IMPRESSION:   1. Homogeneous, low-density right paraesophageal mass as seen on prior CT. 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 This could be   further evaluated with endoscopic ultrasound. 2. Persistent perihilar and infrahilar airspace opacities with additional   peripheral groundglass opacities. While these imaging features can be seen with   Covid-19 pneumonia, they are nonspecific and can occur with a variety of   infectious and noninfectious processes. 3. Hepatic steatosis. 4. Nonspecific, mildly enlarged right paraesophageal lymph node. CT CHEST W CONT   Final Result   IMPRESSION:   1. Homogeneous, low-density right paraesophageal mass as seen on prior CT. 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 This could be   further evaluated with endoscopic ultrasound. 2. Persistent perihilar and infrahilar airspace opacities with additional   peripheral groundglass opacities. While these imaging features can be seen with   Covid-19 pneumonia, they are nonspecific and can occur with a variety of   infectious and noninfectious processes. 3. Hepatic steatosis. 4. Nonspecific, mildly enlarged right paraesophageal lymph node. XR CHEST SNGL V   Final Result   Addendum 1 of 1   Addendum: Addendum/   impression:      Infrahilar disease on the right corresponds to mass seen on CT exam    reported   separately. Please see that report for further assessment pulmonary    findings. Final      CT CHEST WO CONT   Final Result   Impression:      1. 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 Follow-up   contrast enhanced CT of the chest recommended. 2.  Patchy bilateral airspace disease in both lungs consistent with reported   history of atypical viral infection/COVID pneumonia. 3.  Diffusely thickened esophageal wall of uncertain etiology and significance. 4.  Coronary atherosclerosis.    5.  Additional findings as above. CT Results  (Last 48 hours)               11/15/20 2048  CT ABD W CONT Final result    Impression:  IMPRESSION:   1. Homogeneous, low-density right paraesophageal mass as seen on prior CT. 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 This could be   further evaluated with endoscopic ultrasound. 2. Persistent perihilar and infrahilar airspace opacities with additional   peripheral groundglass opacities. While these imaging features can be seen with   Covid-19 pneumonia, they are nonspecific and can occur with a variety of   infectious and noninfectious processes. 3. Hepatic steatosis. 4. Nonspecific, mildly enlarged right paraesophageal lymph node. Narrative:  Examination:   1. CT chest with contrast.   2. CT abdomen with contrast       HISTORY: Mediastinal mass. Technique: Transaxial computed tomographic images of the chest and abdomen were   obtained following the uneventful administration of 100 mL Isovue 370   intravenous contrast. The patient received oral contrast prior to the study. Coronal and sagittal reconstructions were created. Axial MIP images of the chest   were created. Dose Reduction: All CT scans at this facility are performed using dose reduction   optimization techniques as appropriate to a performed exam including the   following: Automated exposure control, adjustments of the mA and/or kV according   to patient size, or use of iterative reconstruction technique. COMPARISON: Chest CT dated 11/12/2020       FINDINGS:       CHEST: There is a mildly enlarged low right paratracheal lymph node, which   measures 11 mm in short axis. The coronary arteries are atherosclerotic. There   is no pericardial effusion. The thoracic aorta is mildly atherosclerotic, but   normal in course and caliber.        Again seen is a low-attenuation lesion along the right aspect of the distal   esophagus, posterior to the right inferior pulmonary vein, which measures 7.0 cm   x 6.9 cm x 8.5 cm. This lesion has an average Hounsfield unit of 30. It is   homogeneous in appearance. There are persistent patchy perihilar and infrahilar airspace opacities. There   are a few additional peripheral groundglass opacities in the upper lobes. In   there is mild atelectasis in the right lung adjacent to the paraesophageal mass. There is mild left basilar atelectasis. There is a small left pleural effusion. There is no pneumothorax. Abdomen: There is hypoattenuation of the liver, suggestive of hepatic steatosis. There is no focal hepatic lesion. There is no biliary duct dilation. No   calcified stones in the gallbladder. There is fatty atrophy of the pancreas. The   spleen and adrenal glands are normal. The kidneys enhance symmetrically. There   is no hydronephrosis. There are no dilated loops of bowel. The appendix is   normal. There is a fat-containing umbilical hernia. The abdominal aorta and   iliac arteries are atherosclerotic. There are scattered subcentimeter nodes, but   no enlarged lymph nodes by CT size criteria. There is multilevel degenerative disc disease. No suspicious osseous lesions. 11/15/20 2048  CT CHEST W CONT Final result    Impression:  IMPRESSION:   1. Homogeneous, low-density right paraesophageal mass as seen on prior CT. 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 This could be   further evaluated with endoscopic ultrasound. 2. Persistent perihilar and infrahilar airspace opacities with additional   peripheral groundglass opacities. While these imaging features can be seen with   Covid-19 pneumonia, they are nonspecific and can occur with a variety of   infectious and noninfectious processes. 3. Hepatic steatosis. 4. Nonspecific, mildly enlarged right paraesophageal lymph node.        Narrative:  Examination:   1. CT chest with contrast.   2. CT abdomen with contrast       HISTORY: Mediastinal mass. Technique: Transaxial computed tomographic images of the chest and abdomen were   obtained following the uneventful administration of 100 mL Isovue 370   intravenous contrast. The patient received oral contrast prior to the study. Coronal and sagittal reconstructions were created. Axial MIP images of the chest   were created. Dose Reduction: All CT scans at this facility are performed using dose reduction   optimization techniques as appropriate to a performed exam including the   following: Automated exposure control, adjustments of the mA and/or kV according   to patient size, or use of iterative reconstruction technique. COMPARISON: Chest CT dated 11/12/2020       FINDINGS:       CHEST: There is a mildly enlarged low right paratracheal lymph node, which   measures 11 mm in short axis. The coronary arteries are atherosclerotic. There   is no pericardial effusion. The thoracic aorta is mildly atherosclerotic, but   normal in course and caliber. Again seen is a low-attenuation lesion along the right aspect of the distal   esophagus, posterior to the right inferior pulmonary vein, which measures 7.0 cm   x 6.9 cm x 8.5 cm. This lesion has an average Hounsfield unit of 30. It is   homogeneous in appearance. There are persistent patchy perihilar and infrahilar airspace opacities. There   are a few additional peripheral groundglass opacities in the upper lobes. In   there is mild atelectasis in the right lung adjacent to the paraesophageal mass. There is mild left basilar atelectasis. There is a small left pleural effusion. There is no pneumothorax. Abdomen: There is hypoattenuation of the liver, suggestive of hepatic steatosis. There is no focal hepatic lesion. There is no biliary duct dilation. No   calcified stones in the gallbladder. There is fatty atrophy of the pancreas. The   spleen and adrenal glands are normal. The kidneys enhance symmetrically.  There is no hydronephrosis. There are no dilated loops of bowel. The appendix is   normal. There is a fat-containing umbilical hernia. The abdominal aorta and   iliac arteries are atherosclerotic. There are scattered subcentimeter nodes, but   no enlarged lymph nodes by CT size criteria. There is multilevel degenerative disc disease. No suspicious osseous lesions. Assessment:     1. Sepsis  2. COVID-19 infection  3. Paraesophageal mass  4. LISA    Plan:     1.)  Sepsis:  Patient has developed sepsis from COVID-19 infection. On azithromycin, Zosyn stopped today by ID. Likely can stop Azithromycin soon as well. His WBC count is elevated, lactic acid level 6.2. He is volume contracted. Continue IV fluids  Repeat lactic acid 2.8    2.)  COVID-19 infection:  He has been confirmed to have COVID-19 infection, repeat test is now negative. Repeat CT scan of the chest showing persistent perihilar and infrahilar airspace opacities with additional  peripheral groundglass opacities. However, the patient is doing very well on room air currently. Received Actemra. Continue remdesivir (day 4/5), being continued on Decadron (recommend a total of 10 days)  Continue azithromycin, but likely does not need more than 5 days. 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  Appreciate GI evaluation; patient is now doing very well on room air without much in terms of respiratory symptoms. He is also now COVID-19 negative. From a pulmonary perspective, I see no issue with undergoing endoscopic evaluation. 4.)  LISA:  Improved after his IV hydration. We will hold his statins for now.         Questions of patient were answered at bedside in detail  Case discussed in detail with RN, RT, and care team    Thank you for involving me in the care of this patient  I will follow with you closely during hospitalization    Time spent more than 30 minutes in direct patient care with no overlap reviewing results and records, decision-making, and answering questions. Peggy Hayward DO  [facility]  11/16/2020  3:01 PM    This documentation was prepared using voice recognition software. Typographical errors may occur.   Attempt has been made to fix those errors however inadvertent errors may persist

## 2020-11-17 LAB
APTT PPP: 39.2 SEC (ref 23–35.7)
D DIMER PPP FEU-MCNC: 3.45 UG/ML(FEU)
EST. AVERAGE GLUCOSE BLD GHB EST-MCNC: 318 MG/DL
FIBRINOGEN PPP-MCNC: 562 MG/DL (ref 220–535)
GLUCOSE BLD STRIP.AUTO-MCNC: 311 MG/DL (ref 65–100)
GLUCOSE BLD STRIP.AUTO-MCNC: 364 MG/DL (ref 65–100)
GLUCOSE BLD STRIP.AUTO-MCNC: 368 MG/DL (ref 65–100)
GLUCOSE BLD STRIP.AUTO-MCNC: 370 MG/DL (ref 65–100)
HBA1C MFR BLD: 12.7 % (ref 4–5.6)
INR PPP: 1.2 (ref 0.9–1.1)
L PNEUMO1 AG UR QL IA: NEGATIVE
PERFORMED BY, TECHID: ABNORMAL
PROTHROMBIN TIME: 15.5 SEC (ref 11.9–14.7)
SPECIMEN SOURCE: NORMAL
THERAPEUTIC RANGE,PTTT: ABNORMAL SEC (ref 68–109)

## 2020-11-17 PROCEDURE — 36415 COLL VENOUS BLD VENIPUNCTURE: CPT

## 2020-11-17 PROCEDURE — 65270000029 HC RM PRIVATE

## 2020-11-17 PROCEDURE — 74011636637 HC RX REV CODE- 636/637: Performed by: INTERNAL MEDICINE

## 2020-11-17 PROCEDURE — 74011250637 HC RX REV CODE- 250/637: Performed by: INTERNAL MEDICINE

## 2020-11-17 PROCEDURE — 85379 FIBRIN DEGRADATION QUANT: CPT

## 2020-11-17 PROCEDURE — 85384 FIBRINOGEN ACTIVITY: CPT

## 2020-11-17 PROCEDURE — 99232 SBSQ HOSP IP/OBS MODERATE 35: CPT | Performed by: INTERNAL MEDICINE

## 2020-11-17 PROCEDURE — 74011000258 HC RX REV CODE- 258: Performed by: INTERNAL MEDICINE

## 2020-11-17 PROCEDURE — 85610 PROTHROMBIN TIME: CPT

## 2020-11-17 PROCEDURE — 82962 GLUCOSE BLOOD TEST: CPT

## 2020-11-17 PROCEDURE — 74011250636 HC RX REV CODE- 250/636: Performed by: INTERNAL MEDICINE

## 2020-11-17 PROCEDURE — 74011000250 HC RX REV CODE- 250: Performed by: INTERNAL MEDICINE

## 2020-11-17 PROCEDURE — 85730 THROMBOPLASTIN TIME PARTIAL: CPT

## 2020-11-17 RX ORDER — INSULIN LISPRO 100 [IU]/ML
10 INJECTION, SOLUTION INTRAVENOUS; SUBCUTANEOUS ONCE
Status: COMPLETED | OUTPATIENT
Start: 2020-11-17 | End: 2020-11-17

## 2020-11-17 RX ORDER — INSULIN LISPRO 100 [IU]/ML
0.1 INJECTION, SOLUTION INTRAVENOUS; SUBCUTANEOUS
Status: DISCONTINUED | OUTPATIENT
Start: 2020-11-18 | End: 2020-11-19

## 2020-11-17 RX ORDER — INSULIN GLARGINE 100 [IU]/ML
35 INJECTION, SOLUTION SUBCUTANEOUS DAILY
Status: DISCONTINUED | OUTPATIENT
Start: 2020-11-18 | End: 2020-11-19

## 2020-11-17 RX ORDER — MAGNESIUM SULFATE 100 %
4 CRYSTALS MISCELLANEOUS AS NEEDED
Status: DISCONTINUED | OUTPATIENT
Start: 2020-11-17 | End: 2020-11-20 | Stop reason: HOSPADM

## 2020-11-17 RX ORDER — DEXTROSE 50 % IN WATER (D50W) INTRAVENOUS SYRINGE
25-50 AS NEEDED
Status: DISCONTINUED | OUTPATIENT
Start: 2020-11-17 | End: 2020-11-20 | Stop reason: HOSPADM

## 2020-11-17 RX ADMIN — INSULIN LISPRO 10 UNITS: 100 INJECTION, SOLUTION INTRAVENOUS; SUBCUTANEOUS at 18:06

## 2020-11-17 RX ADMIN — Medication 2 TABLET: at 08:20

## 2020-11-17 RX ADMIN — ENOXAPARIN SODIUM 30 MG: 100 INJECTION SUBCUTANEOUS at 20:59

## 2020-11-17 RX ADMIN — ENOXAPARIN SODIUM 30 MG: 100 INJECTION SUBCUTANEOUS at 11:21

## 2020-11-17 RX ADMIN — SODIUM CHLORIDE 125 ML/HR: 9 INJECTION, SOLUTION INTRAVENOUS at 13:30

## 2020-11-17 RX ADMIN — INSULIN LISPRO 10 UNITS: 100 INJECTION, SOLUTION INTRAVENOUS; SUBCUTANEOUS at 09:00

## 2020-11-17 RX ADMIN — DEXAMETHASONE SODIUM PHOSPHATE 6 MG: 4 INJECTION, SOLUTION INTRA-ARTICULAR; INTRALESIONAL; INTRAMUSCULAR; INTRAVENOUS; SOFT TISSUE at 18:07

## 2020-11-17 RX ADMIN — Medication 10 ML: at 05:32

## 2020-11-17 RX ADMIN — MELATONIN TAB 5 MG 5 MG: 5 TAB at 21:00

## 2020-11-17 RX ADMIN — Medication 10 ML: at 13:29

## 2020-11-17 RX ADMIN — INSULIN GLARGINE 30 UNITS: 100 INJECTION, SOLUTION SUBCUTANEOUS at 09:00

## 2020-11-17 RX ADMIN — DEXAMETHASONE SODIUM PHOSPHATE 6 MG: 4 INJECTION, SOLUTION INTRA-ARTICULAR; INTRALESIONAL; INTRAMUSCULAR; INTRAVENOUS; SOFT TISSUE at 11:22

## 2020-11-17 RX ADMIN — INSULIN LISPRO 5 UNITS: 100 INJECTION, SOLUTION INTRAVENOUS; SUBCUTANEOUS at 11:32

## 2020-11-17 RX ADMIN — Medication 10 ML: at 21:04

## 2020-11-17 RX ADMIN — DEXAMETHASONE SODIUM PHOSPHATE 6 MG: 4 INJECTION, SOLUTION INTRA-ARTICULAR; INTRALESIONAL; INTRAMUSCULAR; INTRAVENOUS; SOFT TISSUE at 05:32

## 2020-11-17 RX ADMIN — SODIUM CHLORIDE 100 MG: 9 INJECTION, SOLUTION INTRAVENOUS at 13:29

## 2020-11-17 RX ADMIN — AZITHROMYCIN MONOHYDRATE 500 MG: 500 TABLET ORAL at 08:19

## 2020-11-17 NOTE — PROGRESS NOTES
0800 Dr Graciela Nam called and notified of blood sugar reading. Order for 10 units humalog. Called Dr Graciela Nam made him aware he has a 5 unit before meals mixed with sliding scale. States give 10 unit only with the Lantus. 08:22 Patient informed of new order for insulin dose. Voice understanding.

## 2020-11-17 NOTE — PROGRESS NOTES
Patient requesting to take a shower. IV placed on hold site wrapped up. Patient up ad alex to shower. Pulmonary doctor at bedside. states patient cleared from pulmonary standpoint.

## 2020-11-17 NOTE — PROGRESS NOTES
Infectious Disease Progress Note           Subjective:   Pt seen and examined at bedside. Continues to do well clinically. Denies new complaints. No acute events since last seen  Objective:   Physical Exam:     Visit Vitals  /68   Pulse 77   Temp (!) 96.7 °F (35.9 °C)   Resp 18   Ht 5' 10\" (1.778 m)   Wt 110 kg (242 lb 8.1 oz)   SpO2 95%   BMI 34.80 kg/m²    O2 Flow Rate (L/min): 2 l/min O2 Device: Room air    Temp (24hrs), Av.5 °F (36.4 °C), Min:96.7 °F (35.9 °C), Max:98.3 °F (36.8 °C)    No intake/output data recorded. No intake/output data recorded. General: NAD, AAO x 4  HEENT: GIDEON, Moist mucosa   Lungs: Decreased at the bases, no wheeze/rhonchi   Heart: S1S2+, RRR, no murmur  Abdo: Soft, NT, ND, +BS   Exts: No edema, + pulses b/l   Skin: No wounds, No rashes or lesions    Data Review:       Recent Days:  Recent Labs     20  0410 11/15/20  0602   WBC 10.4 12.7*   HGB 12.7 12.9   HCT 36.7 37.6    211     No results for input(s): BUN, CREA in the last 72 hours. Microbiology   - Blood Cx : Neg     Diagnostics   CXR Results  (Last 48 hours)    None        CT chest/abdo/pel 11/15: Homogeneous, low-density right paraesophageal mass as seen on prior CT. 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 This could be further evaluated with endoscopic ultrasound. Persistent perihilar and infrahilar airspace opacities with additional peripheral groundglass opacities. While these imaging features can be seen with Covid-19 pneumonia, they are nonspecific and can occur with a variety of infectious and noninfectious processes. Assessment/Plan     1. COVID-19 w pneumonitis: Tested positive as out pt on       Patchy b/l infiltrates on CT chest. Remains on RA, denies productive cough       Afebrile, leukocytosis is trending down      On day # 5 of Azithromycin and Decadron       Repeat test for COVID-19 is neg     2.  Large paraesophageal mass: Incidental finding on CT chest         Mass appears cystic on contrast enhanced CT      GI planning on EUS on 11/20     3. Acute renal failure: Cr 1.48, Cr down to 0.77 on 11/14    4. Diarrhea: likely antibiotic associated, resolved after stopping Zosyn     5. DM: Recently diagnosed, A1C if pending.  Will likely benefit from eval by a diabetic educator     Dispo: Cleared for d/c from [facility] stand point     Srinivasan Burrell MD    11/17/2020

## 2020-11-17 NOTE — PROGRESS NOTES
Progress Note    Patient: Sharon Doran MRN: [id number]  SSN: [id number]    YOB: 1975  Age: 39 y.o. Sex: male      Admit Date: 11/12/2020    LOS: 4 days     Subjective:     68-year-old patient admitted with pneumonia initially COVID-19 positive repeat test was negative patient is on room air. Pulmonary notes have been reviewed    Objective:     Vitals:    11/16/20 2031 11/17/20 0713 11/17/20 1120 11/17/20 1701   BP: 124/78 116/65 120/68 109/68   Pulse: 84 75 77 77   Resp: 20 18 16 18   Temp: 98.3 °F (36.8 °C) 97.3 °F (36.3 °C) 97.5 °F (36.4 °C) (!) 96.7 °F (35.9 °C)   SpO2: 93% 93% 91% 95%   Weight:       Height:            Intake and Output:  Current Shift: No intake/output data recorded. Last three shifts: No intake/output data recorded. Physical Exam:   General:  Alert, cooperative, no distress, appears stated age. Eyes:  Conjunctivae/corneas clear. PERRL, EOMs intact. Fundi benign   Ears:  Normal TMs and external ear canals both ears. Nose: Nares normal. Septum midline. Mucosa normal. No drainage or sinus tenderness. Mouth/Throat: Lips, mucosa, and tongue normal. Teeth and gums normal.   Neck: Supple, symmetrical, trachea midline, no adenopathy, thyroid: no enlargment/tenderness/nodules, no carotid bruit and no JVD. Back:   Symmetric, no curvature. ROM normal. No CVA tenderness. Lungs:   Clear to auscultation bilaterally. Heart:  Regular rate and rhythm, S1, S2 normal, no murmur, click, rub or gallop. Abdomen:   Soft, non-tender. Bowel sounds normal. No masses,  No organomegaly. Extremities: Extremities normal, atraumatic, no cyanosis or edema. Pulses: 2+ and symmetric all extremities. Skin: Skin color, texture, turgor normal. No rashes or lesions   Lymph nodes: Cervical, supraclavicular, and axillary nodes normal.   Neurologic: CNII-XII intact. Normal strength, sensation and reflexes throughout. Lab/Data Review:   All lab results for the last 24 hours reviewed. Recent Results (from the past 24 hour(s))   GLUCOSE, POC    Collection Time: 11/16/20  9:16 PM   Result Value Ref Range    Glucose (POC) 399 (H) 65 - 100 mg/dL    Performed by [facility], POC    Collection Time: 11/17/20  7:12 AM   Result Value Ref Range    Glucose (POC) 364 (H) 65 - 100 mg/dL    Performed by Rishabh An    GLUCOSE, POC    Collection Time: 11/17/20 11:19 AM   Result Value Ref Range    Glucose (POC) 311 (H) 65 - 100 mg/dL    Performed by Rishabh An    GLUCOSE, POC    Collection Time: 11/17/20  4:56 PM   Result Value Ref Range    Glucose (POC) 368 (H) 65 - 100 mg/dL    Performed by Arcelia Chambers          Assessment:     Active Problems:    Pneumonia due to COVID-19 virus (11/13/2020)      Sepsis ([address] (11/13/2020)        Plan:     Possible discharge in a.m.     Signed By: Chhaya Murphy MD     November 17, 2020

## 2020-11-17 NOTE — PROGRESS NOTES
Reason for Admission:   Pneumonia due to COVID-19                  RUR Score:     11%             PCP: First and Last name:  No PCP - Patient goes to [facility]   Name of Practice:    Are you a current patient: Yes/No:    Approximate date of last visit:    Can you participate in a virtual visit if needed:     Do you (patient/family) have any concerns for transition/discharge? No              Plan for utilizing home health:   No              Transition of Care Plan:      Home    CM met with pt at the bedside to complete discharge assessment. CM verified pt's home address. Pt did not have a Emergency Contact listed. Cm asked pt if he would like to add an Emergency Contact. Pt would like to add his mother Tanvir Mcdonough [phone] and girlfriend - Ashleigh Pike [phone] as his emergency contact. Pt reports living at home alone. Pt ambulates without DME. Pt is not current with home health. Cm asked pt if he had a PCP and pt reports he does not have one. Pt indicated he goes to [facility]. Cm offered pt a list of PCP's, however pt declined. Cm will continue to follow.

## 2020-11-17 NOTE — PROGRESS NOTES
Progress Note    Patient: Ward Morales MRN: [id number]  SSN: [id number]    YOB: 1975  Age: 39 y.o. Sex: male      Admit Date: 11/12/2020    LOS: 3 days     Subjective:     51-year-old patient with pneumonitis pneumonia paraesophageal mass with repeat COVID-19 test negative    Objective:     Vitals:    11/16/20 0730 11/16/20 0735 11/16/20 1240 11/16/20 1434   BP: 105/73  112/74 122/77   Pulse: 76  87 77   Resp: 20 20 20   Temp:  97.8 °F (36.6 °C) 98.4 °F (36.9 °C) 98.5 °F (36.9 °C)   SpO2: 94%  92% 91%   Weight:       Height:            Intake and Output:  Current Shift: No intake/output data recorded. Last three shifts: No intake/output data recorded.     Current Facility-Administered Medications   Medication Dose Route Frequency Provider Last Rate Last Dose    melatonin tablet 5 mg  5 mg Oral QHS Julia ERAZO MD   5 mg at 11/15/20 2156    insulin glargine (LANTUS) injection 30 Units  30 Units SubCUTAneous DAILY Julia ERAZO MD   30 Units at 11/16/20 0910    insulin lispro (HUMALOG) injection 5 Units  5 Units SubCUTAneous TIDAC Julia ERAZO MD   5 Units at 11/16/20 1632    enoxaparin (LOVENOX) injection 30 mg  30 mg SubCUTAneous Q12H Julia ERAZO MD   30 mg at 11/16/20 0911    acetaminophen (TYLENOL) tablet 650 mg  650 mg Oral Q6H PRN Julia ERAZO MD   650 mg at 11/13/20 2343    Or    acetaminophen (TYLENOL) suppository 650 mg  650 mg Rectal Q6H PRN Shashi Henning MD        cholecalciferol (VITAMIN D3) (1000 Units /25 mcg) tablet 2 Tab  2,000 Units Oral DAILY Shashi Henning MD   2 Tab at 11/16/20 0911    guaiFENesin-dextromethorphan (ROBITUSSIN DM) 100-10 mg/5 mL syrup 5 mL  5 mL Oral Q4H PRN Julia ERAZO MD        sodium chloride (NS) flush 5-40 mL  5-40 mL IntraVENous Q8H Reynold Thornton MD   10 mL at 11/16/20 1438    sodium chloride (NS) flush 5-40 mL  5-40 mL IntraVENous PRN Julia ERAZO MD        bisacodyL (DULCOLAX) tablet 5 mg 5 mg Oral DAILY PRN Stefan Evans MD        glucose chewable tablet 16 g  4 Tab Oral PRN Abi ERAZO MD        dextrose (D50W) injection syrg 12.5-25 g  25-50 mL IntraVENous PRN Abi ERAZO MD        glucagon (GLUCAGEN) injection 1 mg  1 mg IntraMUSCular PRN Abi ERAZO MD        dexamethasone (DECADRON) 4 mg/mL injection 6 mg  6 mg IntraVENous Q6H Cheli Gtz MD   6 mg at 11/16/20 1717    benzonatate (TESSALON) capsule 100 mg  100 mg Oral TID PRN Jj Crenshaw MD   100 mg at 11/13/20 2343    remdesivir 100 mg in 0.9% sodium chloride 250 mL IVPB  100 mg IntraVENous Q24H Preet Reeves MD   100 mg at 11/16/20 1447    azithromycin (ZITHROMAX) tablet 500 mg  500 mg Oral DAILY Abi ERAZO MD   500 mg at 11/16/20 0911    0.9% sodium chloride infusion  125 mL/hr IntraVENous CONTINUOUS Aib ERAZO  mL/hr at 11/16/20 1557 125 mL/hr at 11/16/20 1557           Lab/Data Review: All lab results for the last 24 hours reviewed.      Recent Results (from the past 24 hour(s))   GLUCOSE, POC    Collection Time: 11/15/20  9:20 PM   Result Value Ref Range    Glucose (POC) 360 (H) 65 - 100 mg/dL    Performed by Torin Shine    PROTHROMBIN TIME + INR    Collection Time: 11/16/20  4:10 AM   Result Value Ref Range    Prothrombin time 15.1 (H) 11.9 - 14.7 sec    INR 1.2 (H) 0.9 - 1.1     PTT    Collection Time: 11/16/20  4:10 AM   Result Value Ref Range    aPTT 49.1 (H) 23.0 - 35.7 sec    aPTT, therapeutic range   68 - 109 sec   FIBRINOGEN    Collection Time: 11/16/20  4:10 AM   Result Value Ref Range    Fibrinogen 743 (H) 220 - 535 mg/dL   D DIMER    Collection Time: 11/16/20  4:10 AM   Result Value Ref Range    D DIMER 1.76 (H) <0.50 ug/ml(FEU)   LD    Collection Time: 11/16/20  4:10 AM   Result Value Ref Range     (H) 85 - 241 U/L   CBC WITH AUTOMATED DIFF    Collection Time: 11/16/20  4:10 AM   Result Value Ref Range    WBC 10.4 4.1 - 11.1 K/uL    RBC 4.17 4.10 - 5.70 M/uL    HGB 12.7 12.1 - 17.0 g/dL    HCT 36.7 36.6 - 50.3 %    MCV 88.0 80.0 - 99.0 FL    MCH 30.5 26.0 - 34.0 PG    MCHC 34.6 30.0 - 36.5 g/dL    RDW 14.2 11.5 - 14.5 %    PLATELET 374 415 - 464 K/uL    MPV 11.6 8.9 - 12.9 FL    NEUTROPHILS 76 (H) 32 - 75 %    BAND NEUTROPHILS 2 0 - 6 %    LYMPHOCYTES 11 (L) 12 - 49 %    MONOCYTES 11 5 - 13 %    EOSINOPHILS 0 0 - 7 %    BASOPHILS 0 0 - 1 %    IMMATURE GRANULOCYTES 0 %    ABS. NEUTROPHILS 8.2 (H) 1.8 - 8.0 K/UL    ABS. LYMPHOCYTES 1.1 0.8 - 3.5 K/UL    ABS. MONOCYTES 1.1 (H) 0.0 - 1.0 K/UL    ABS. EOSINOPHILS 0.0 0.0 - 0.4 K/UL    ABS. BASOPHILS 0.0 0.0 - 0.1 K/UL    ABS. IMM.  GRANS. 0.0 K/UL    DF Manual      RBC COMMENTS Normocytic, Normochromic     GLUCOSE, POC    Collection Time: 11/16/20  7:25 AM   Result Value Ref Range    Glucose (POC) 292 (H) 65 - 100 mg/dL    Performed by [address] [place] [address], POC    Collection Time: 11/16/20 12:04 PM   Result Value Ref Range    Glucose (POC) 293 (H) 65 - 100 mg/dL    Performed by [address] [place] [address], POC    Collection Time: 11/16/20  4:19 PM   Result Value Ref Range    Glucose (POC) 328 (H) 65 - 100 mg/dL    Performed by Terri Polanco          Assessment:     Active Problems:    Pneumonia due to COVID-19 virus (11/13/2020)      Sepsis ([facility] [address] (11/13/2020)    Acute hypoxia    Plan:     Continue on IV steroids and Zithromax per notes infectious disease notes reviewed and pulmonary notes    Signed By: Venice Calvin MD     November 16, 2020

## 2020-11-17 NOTE — PROGRESS NOTES
Problem: Risk for Spread of Infection  Goal: Prevent transmission of infectious organism to others  Description: Prevent the transmission of infectious organisms to other patients, staff members, and visitors. Outcome: Progressing Towards Goal     Problem: Patient Education:  Go to Education Activity  Goal: Patient/Family Education  Outcome: Progressing Towards Goal     Problem: Falls - Risk of  Goal: *Absence of Falls  Description: Document Adilia Jarvis Fall Risk and appropriate interventions in the flowsheet. Outcome: Progressing Towards Goal  Note: Fall Risk Interventions:            Medication Interventions: Teach patient to arise slowly                   Problem: Patient Education: Go to Patient Education Activity  Goal: Patient/Family Education  Outcome: Progressing Towards Goal     Problem: Diabetes Self-Management  Goal: *Disease process and treatment process  Description: Define diabetes and identify own type of diabetes; list 3 options for treating diabetes. Outcome: Progressing Towards Goal  Goal: *Incorporating nutritional management into lifestyle  Description: Describe effect of type, amount and timing of food on blood glucose; list 3 methods for planning meals. Outcome: Progressing Towards Goal  Goal: *Incorporating physical activity into lifestyle  Description: State effect of exercise on blood glucose levels. Outcome: Progressing Towards Goal  Goal: *Developing strategies to promote health/change behavior  Description: Define the ABC's of diabetes; identify appropriate screenings, schedule and personal plan for screenings. Outcome: Progressing Towards Goal  Goal: *Using medications safely  Description: State effect of diabetes medications on diabetes; name diabetes medication taking, action and side effects. Outcome: Progressing Towards Goal  Goal: *Monitoring blood glucose, interpreting and using results  Description: Identify recommended blood glucose targets  and personal targets.   Outcome: Progressing Towards Goal  Goal: *Prevention, detection, treatment of acute complications  Description: List symptoms of hyper- and hypoglycemia; describe how to treat low blood sugar and actions for lowering  high blood glucose level. Outcome: Progressing Towards Goal  Goal: *Prevention, detection and treatment of chronic complications  Description: Define the natural course of diabetes and describe the relationship of blood glucose levels to long term complications of diabetes.   Outcome: Progressing Towards Goal  Goal: *Developing strategies to address psychosocial issues  Description: Describe feelings about living with diabetes; identify support needed and support network  Outcome: Progressing Towards Goal  Goal: *Insulin pump training  Outcome: Progressing Towards Goal  Goal: *Sick day guidelines  Outcome: Progressing Towards Goal  Goal: *Patient Specific Goal (EDIT GOAL, INSERT TEXT)  Outcome: Progressing Towards Goal     Problem: Patient Education: Go to Patient Education Activity  Goal: Patient/Family Education  Outcome: Progressing Towards Goal     Problem: Discharge Planning  Goal: *Discharge to safe environment  Outcome: Progressing Towards Goal  Goal: *Knowledge of medication management  Outcome: Progressing Towards Goal  Goal: *Knowledge of discharge instructions  Outcome: Progressing Towards Goal     Problem: Patient Education: Go to Patient Education Activity  Goal: Patient/Family Education  Outcome: Progressing Towards Goal

## 2020-11-17 NOTE — PROGRESS NOTES
Pulmonology and Critical Care Progress Note    Subjective:     Chief Complaint:   Chief Complaint   Patient presents with    Chest Pain          Patient seen and examined in his room this afternoon. No acute events overnight. I discussed the case in detail with the patient at the bedside. His repeat COVID-19 test is negative. Lying in bed comfortably  Awake and alert  On room air now and saturating between 91 and 95%. Patient admits that his respiratory status is well controlled. No acute distress      Review of Systems:  A comprehensive review of systems was negative except for that written in the HPI.     Current Facility-Administered Medications   Medication Dose Route Frequency Provider Last Rate Last Dose    melatonin tablet 5 mg  5 mg Oral QHS Kapil ERAZO MD   5 mg at 11/16/20 2103    insulin glargine (LANTUS) injection 30 Units  30 Units SubCUTAneous DAILY Kapil ERAZO MD   30 Units at 11/17/20 0900    insulin lispro (HUMALOG) injection 5 Units  5 Units SubCUTAneous TIDAC Kapil ERAZO MD   5 Units at 11/17/20 1132    enoxaparin (LOVENOX) injection 30 mg  30 mg SubCUTAneous Q12H Kapil ERAZO MD   30 mg at 11/17/20 1121    acetaminophen (TYLENOL) tablet 650 mg  650 mg Oral Q6H PRN Kapil ERAZO MD   650 mg at 11/13/20 2343    Or    acetaminophen (TYLENOL) suppository 650 mg  650 mg Rectal Q6H PRN Anita Pierre MD        cholecalciferol (VITAMIN D3) (1000 Units /25 mcg) tablet 2 Tab  2,000 Units Oral DAILY Anita Pierre MD   2 Tab at 11/17/20 0820    guaiFENesin-dextromethorphan (ROBITUSSIN DM) 100-10 mg/5 mL syrup 5 mL  5 mL Oral Q4H PRN Kapil ERAZO MD        sodium chloride (NS) flush 5-40 mL  5-40 mL IntraVENous Q8H Reynold Thornton MD   10 mL at 11/17/20 1329    sodium chloride (NS) flush 5-40 mL  5-40 mL IntraVENous PRN Kapil ERAZO MD        bisacodyL (DULCOLAX) tablet 5 mg  5 mg Oral DAILY PRN Anita Pierre MD        glucose chewable tablet 16 g  4 Tab Oral PRN Silvia ERAZO MD        dextrose (D50W) injection syrg 12.5-25 g  25-50 mL IntraVENous PRN Silvia ERAZO MD        glucagon (GLUCAGEN) injection 1 mg  1 mg IntraMUSCular PRN Silvia ERAZO MD        dexamethasone (DECADRON) 4 mg/mL injection 6 mg  6 mg IntraVENous Q6H Yolanda Hsu MD   6 mg at 20 1122    benzonatate (TESSALON) capsule 100 mg  100 mg Oral TID PRN Niles Zurita MD   100 mg at 20 2343    azithromycin (ZITHROMAX) tablet 500 mg  500 mg Oral DAILY Basim Land MD   500 mg at 20 0819    0.9% sodium chloride infusion  125 mL/hr IntraVENous CONTINUOUS Silvia ERAZO  mL/hr at 20 1330 125 mL/hr at 20 1330            No Known Allergies        Objective:     Blood pressure 120/68, pulse 77, temperature 97.5 °F (36.4 °C), resp. rate 16, height 5' 10\" (1.778 m), weight 110 kg (242 lb 8.1 oz), SpO2 91 %. Temp (24hrs), Av.7 °F (36.5 °C), Min:97.3 °F (36.3 °C), Max:98.3 °F (36.8 °C)      Intake and Output:  Current Shift: No intake/output data recorded. Last 3 Shifts: No intake/output data recorded. Physical Exam:    General: Lying in bed comfortably, no acute distress  Eye: Reactive, symmetric  Throat and Neck: Supple  Lung: Reduced air entry bilaterally with prolonged exhalation but no wheezing. Occasional crackles. On room air. Heart: S1+S2. No murmurs  Abdomen: soft, non-tender. Bowel sounds normal. No masses; obese  Extremities: No edema  : Not done  Skin: No cyanosis  Neurologic: A & O x3.   Grossly nonfocal  Psychiatric: Appropriate affect; coherent    Lab/Data Review:  Recent Results (from the past 24 hour(s))   GLUCOSE, POC    Collection Time: 20  4:19 PM   Result Value Ref Range    Glucose (POC) 328 (H) 65 - 100 mg/dL    Performed by [address] [place] [address], POC    Collection Time: 20  9:16 PM   Result Value Ref Range    Glucose (POC) 399 (H) 65 - 100 mg/dL    Performed by PRINCE MIA    GLUCOSE, POC    Collection Time: 11/17/20  7:12 AM   Result Value Ref Range    Glucose (POC) 364 (H) 65 - 100 mg/dL    Performed by Ariella Blackwood    GLUCOSE, POC    Collection Time: 11/17/20 11:19 AM   Result Value Ref Range    Glucose (POC) 311 (H) 65 - 100 mg/dL    Performed by Ariella Blackwood      chest X-ray      CT ABD W CONT   Final Result   IMPRESSION:   1. Homogeneous, low-density right paraesophageal mass as seen on prior CT. 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 This could be   further evaluated with endoscopic ultrasound. 2. Persistent perihilar and infrahilar airspace opacities with additional   peripheral groundglass opacities. While these imaging features can be seen with   Covid-19 pneumonia, they are nonspecific and can occur with a variety of   infectious and noninfectious processes. 3. Hepatic steatosis. 4. Nonspecific, mildly enlarged right paraesophageal lymph node. CT CHEST W CONT   Final Result   IMPRESSION:   1. Homogeneous, low-density right paraesophageal mass as seen on prior CT. 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 This could be   further evaluated with endoscopic ultrasound. 2. Persistent perihilar and infrahilar airspace opacities with additional   peripheral groundglass opacities. While these imaging features can be seen with   Covid-19 pneumonia, they are nonspecific and can occur with a variety of   infectious and noninfectious processes. 3. Hepatic steatosis. 4. Nonspecific, mildly enlarged right paraesophageal lymph node. XR CHEST SNGL V   Final Result   Addendum 1 of 1   Addendum: Addendum/   impression:      Infrahilar disease on the right corresponds to mass seen on CT exam    reported   separately. Please see that report for further assessment pulmonary    findings. Final      CT CHEST WO CONT   Final Result   Impression:      1.   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 Follow-up   contrast enhanced CT of the chest recommended. 2.  Patchy bilateral airspace disease in both lungs consistent with reported   history of atypical viral infection/COVID pneumonia. 3.  Diffusely thickened esophageal wall of uncertain etiology and significance. 4.  Coronary atherosclerosis. 5.  Additional findings as above. CT Results  (Last 48 hours)               11/15/20 2048  CT ABD W CONT Final result    Impression:  IMPRESSION:   1. Homogeneous, low-density right paraesophageal mass as seen on prior CT. 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 This could be   further evaluated with endoscopic ultrasound. 2. Persistent perihilar and infrahilar airspace opacities with additional   peripheral groundglass opacities. While these imaging features can be seen with   Covid-19 pneumonia, they are nonspecific and can occur with a variety of   infectious and noninfectious processes. 3. Hepatic steatosis. 4. Nonspecific, mildly enlarged right paraesophageal lymph node. Narrative:  Examination:   1. CT chest with contrast.   2. CT abdomen with contrast       HISTORY: Mediastinal mass. Technique: Transaxial computed tomographic images of the chest and abdomen were   obtained following the uneventful administration of 100 mL Isovue 370   intravenous contrast. The patient received oral contrast prior to the study. Coronal and sagittal reconstructions were created. Axial MIP images of the chest   were created. Dose Reduction: All CT scans at this facility are performed using dose reduction   optimization techniques as appropriate to a performed exam including the   following: Automated exposure control, adjustments of the mA and/or kV according   to patient size, or use of iterative reconstruction technique.        COMPARISON: Chest CT dated 11/12/2020       FINDINGS:       CHEST: There is a mildly enlarged low right paratracheal lymph node, which   measures 11 mm in short axis. The coronary arteries are atherosclerotic. There   is no pericardial effusion. The thoracic aorta is mildly atherosclerotic, but   normal in course and caliber. Again seen is a low-attenuation lesion along the right aspect of the distal   esophagus, posterior to the right inferior pulmonary vein, which measures 7.0 cm   x 6.9 cm x 8.5 cm. This lesion has an average Hounsfield unit of 30. It is   homogeneous in appearance. There are persistent patchy perihilar and infrahilar airspace opacities. There   are a few additional peripheral groundglass opacities in the upper lobes. In   there is mild atelectasis in the right lung adjacent to the paraesophageal mass. There is mild left basilar atelectasis. There is a small left pleural effusion. There is no pneumothorax. Abdomen: There is hypoattenuation of the liver, suggestive of hepatic steatosis. There is no focal hepatic lesion. There is no biliary duct dilation. No   calcified stones in the gallbladder. There is fatty atrophy of the pancreas. The   spleen and adrenal glands are normal. The kidneys enhance symmetrically. There   is no hydronephrosis. There are no dilated loops of bowel. The appendix is   normal. There is a fat-containing umbilical hernia. The abdominal aorta and   iliac arteries are atherosclerotic. There are scattered subcentimeter nodes, but   no enlarged lymph nodes by CT size criteria. There is multilevel degenerative disc disease. No suspicious osseous lesions. 11/15/20 2048  CT CHEST W CONT Final result    Impression:  IMPRESSION:   1. Homogeneous, low-density right paraesophageal mass as seen on prior CT. 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 This could be   further evaluated with endoscopic ultrasound. 2. Persistent perihilar and infrahilar airspace opacities with additional   peripheral groundglass opacities. While these imaging features can be seen with   Covid-19 pneumonia, they are nonspecific and can occur with a variety of   infectious and noninfectious processes. 3. Hepatic steatosis. 4. Nonspecific, mildly enlarged right paraesophageal lymph node. Narrative:  Examination:   1. CT chest with contrast.   2. CT abdomen with contrast       HISTORY: Mediastinal mass. Technique: Transaxial computed tomographic images of the chest and abdomen were   obtained following the uneventful administration of 100 mL Isovue 370   intravenous contrast. The patient received oral contrast prior to the study. Coronal and sagittal reconstructions were created. Axial MIP images of the chest   were created. Dose Reduction: All CT scans at this facility are performed using dose reduction   optimization techniques as appropriate to a performed exam including the   following: Automated exposure control, adjustments of the mA and/or kV according   to patient size, or use of iterative reconstruction technique. COMPARISON: Chest CT dated 11/12/2020       FINDINGS:       CHEST: There is a mildly enlarged low right paratracheal lymph node, which   measures 11 mm in short axis. The coronary arteries are atherosclerotic. There   is no pericardial effusion. The thoracic aorta is mildly atherosclerotic, but   normal in course and caliber. Again seen is a low-attenuation lesion along the right aspect of the distal   esophagus, posterior to the right inferior pulmonary vein, which measures 7.0 cm   x 6.9 cm x 8.5 cm. This lesion has an average Hounsfield unit of 30. It is   homogeneous in appearance. There are persistent patchy perihilar and infrahilar airspace opacities. There   are a few additional peripheral groundglass opacities in the upper lobes. In   there is mild atelectasis in the right lung adjacent to the paraesophageal mass. There is mild left basilar atelectasis.  There is a small left pleural effusion. There is no pneumothorax. Abdomen: There is hypoattenuation of the liver, suggestive of hepatic steatosis. There is no focal hepatic lesion. There is no biliary duct dilation. No   calcified stones in the gallbladder. There is fatty atrophy of the pancreas. The   spleen and adrenal glands are normal. The kidneys enhance symmetrically. There   is no hydronephrosis. There are no dilated loops of bowel. The appendix is   normal. There is a fat-containing umbilical hernia. The abdominal aorta and   iliac arteries are atherosclerotic. There are scattered subcentimeter nodes, but   no enlarged lymph nodes by CT size criteria. There is multilevel degenerative disc disease. No suspicious osseous lesions. Assessment:     1. Sepsis  2. COVID-19 infection  3. Paraesophageal mass  4. LISA    Plan:     1.)  Sepsis:  Patient has developed sepsis from COVID-19 infection. On azithromycin, Zosyn stopped by ID. Likely can stop Azithromycin soon as well. Lactate and white blood cell count normalized. Sputum culture with normal edita. If no further GI work-up inpatient, okay to discharge home from a pulmonary perspective. 2.)  COVID-19 infection:  He has been confirmed to have COVID-19 infection, repeat test is now negative. Repeat CT scan of the chest showing persistent perihilar and infrahilar airspace opacities with additional  peripheral groundglass opacities. However, the patient is doing very well on room air currently. Received Actemra. Continue remdesivir (day 5/5), being continued on Decadron (recommend a total of 10 days). Decadron can be discontinued on the day of discharge. Continue azithromycin, but likely does not need more than 5 days.     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  Appreciate GI evaluation; patient is now doing very well on room air without much in terms of respiratory symptoms. He is also now COVID-19 negative. From a pulmonary perspective, I see no issue with undergoing endoscopic evaluation. 4.)  LISA:  Improved after his IV hydration. We will hold his statins for now. Questions of patient were answered at bedside in detail  Case discussed in detail with RN, RT, and care team    Thank you for involving me in the care of this patient  I will follow with you closely during hospitalization    Time spent more than 30 minutes in direct patient care with no overlap reviewing results and records, decision-making, and answering questions. Jennifer Quiñonez DO  [facility]  11/17/2020  3:01 PM    This documentation was prepared using voice recognition software. Typographical errors may occur.   Attempt has been made to fix those errors however inadvertent errors may persist

## 2020-11-17 NOTE — CONSULTS
Gastroenterology Consult     Referring Physician: Cosme Bowen MD     Consult Date: 11/17/2020     Subjective:     Chief Complaint:  Complains of headache diarrhea body aches dry cough and fever. He was diagnosed with COVID-19 bilateral pneumonia. History of Present Illness: Theresa Lima is a 39 y.o. male who is seen in consultation for paraesophageal mass as seen in CT scan of chest on 11/12/2020. He mentioned that he also had an incidental finding of mass in his neck area when he was being tested/screened for a heart procedure 2 years ago. No procedure was done for the mass. He denies any symptoms like shortness of breath or difficulty swallowing then. His covid-19 re-test came back negative. He complains of throat pain from too much coughing, but it is a lot better compared before. He also complained of odynophagia (now better), again related to coughing. Denies nausea or vomiting. 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 History reviewed. No pertinent family history.   Social History     Tobacco Use    Smoking status: Never Smoker    Smokeless tobacco: Never Used   Substance Use Topics    Alcohol use: Never     Frequency: Never      No Known Allergies  Current Facility-Administered Medications   Medication Dose Route Frequency    melatonin tablet 5 mg  5 mg Oral QHS    insulin glargine (LANTUS) injection 30 Units  30 Units SubCUTAneous DAILY    insulin lispro (HUMALOG) injection 5 Units  5 Units SubCUTAneous TIDAC    enoxaparin (LOVENOX) injection 30 mg  30 mg SubCUTAneous Q12H    acetaminophen (TYLENOL) tablet 650 mg  650 mg Oral Q6H PRN    Or    acetaminophen (TYLENOL) suppository 650 mg  650 mg Rectal Q6H PRN    cholecalciferol (VITAMIN D3) (1000 Units /25 mcg) tablet 2 Tab  2,000 Units Oral DAILY    guaiFENesin-dextromethorphan (ROBITUSSIN DM) 100-10 mg/5 mL syrup 5 mL  5 mL Oral Q4H PRN    sodium chloride (NS) flush 5-40 mL  5-40 mL IntraVENous Q8H    sodium chloride (NS) flush 5-40 mL  5-40 mL IntraVENous PRN    bisacodyL (DULCOLAX) tablet 5 mg  5 mg Oral DAILY PRN    glucose chewable tablet 16 g  4 Tab Oral PRN    dextrose (D50W) injection syrg 12.5-25 g  25-50 mL IntraVENous PRN    glucagon (GLUCAGEN) injection 1 mg  1 mg IntraMUSCular PRN    dexamethasone (DECADRON) 4 mg/mL injection 6 mg  6 mg IntraVENous Q6H    benzonatate (TESSALON) capsule 100 mg  100 mg Oral TID PRN    azithromycin (ZITHROMAX) tablet 500 mg  500 mg Oral DAILY    0.9% sodium chloride infusion  125 mL/hr IntraVENous CONTINUOUS        Review of Systems:  A detailed 10 organ review of systems is obtained with pertinent positives as listed in the History of Present Illness and Past Medical History. All others are negative. Objective:     Physical Exam:  Visit Vitals  /68   Pulse 77   Temp 97.5 °F (36.4 °C)   Resp 16   Ht 5' 10\" (1.778 m)   Wt 110 kg (242 lb 8.1 oz)   SpO2 91%   BMI 34.80 kg/m²        Skin:  Extremities and face reveal no rashes. No valenzuela erythema. No telangiectasias on the chest wall. HEENT: Sclerae anicteric. Extra-occular muscles are intact. No oral ulcers. No abnormal pigmentation of the lips. The neck is supple. Cardiovascular: Regular rate and rhythm. No murmurs, gallops, or rubs. PMI nondisplaced. Carotids without bruits. Respiratory:  Comfortable breathing with no accessory muscle use. Clear breath sounds with no wheezes, rales, or rhonchi. GI:  Abdomen nondistended, soft, and nontender. Normal active bowel sounds. No enlargement of the liver or spleen. No masses palpable. Rectal:  Deferred  Musculoskeletal:  No pitting edema of the lower legs. Extremities have good range of motion. No costovertebral tenderness. Neurological:  Gross memory appears intact. Patient is alert and oriented.   Psychiatric:  Mood appears appropriate with judgement intact. Lymphatic:  No cervical or supraclavicular adenopathy. Lab/Data Review:  Recent Results (from the past 24 hour(s))   GLUCOSE, POC    Collection Time: 11/16/20  4:19 PM   Result Value Ref Range    Glucose (POC) 328 (H) 65 - 100 mg/dL    Performed by Bj Flor    GLUCOSE, POC    Collection Time: 11/16/20  9:16 PM   Result Value Ref Range    Glucose (POC) 399 (H) 65 - 100 mg/dL    Performed by [address] [place] [address], POC    Collection Time: 11/17/20  7:12 AM   Result Value Ref Range    Glucose (POC) 364 (H) 65 - 100 mg/dL    Performed by Quinton Tierney    GLUCOSE, POC    Collection Time: 11/17/20 11:19 AM   Result Value Ref Range    Glucose (POC) 311 (H) 65 - 100 mg/dL    Performed by Quinton Tierney         CT ABD W CONT   Final Result   IMPRESSION:   1. Homogeneous, low-density right paraesophageal mass as seen on prior CT. 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 This could be   further evaluated with endoscopic ultrasound. 2. Persistent perihilar and infrahilar airspace opacities with additional   peripheral groundglass opacities. While these imaging features can be seen with   Covid-19 pneumonia, they are nonspecific and can occur with a variety of   infectious and noninfectious processes. 3. Hepatic steatosis. 4. Nonspecific, mildly enlarged right paraesophageal lymph node. CT CHEST W CONT   Final Result   IMPRESSION:   1. Homogeneous, low-density right paraesophageal mass as seen on prior CT. 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 This could be   further evaluated with endoscopic ultrasound. 2. Persistent perihilar and infrahilar airspace opacities with additional   peripheral groundglass opacities. While these imaging features can be seen with   Covid-19 pneumonia, they are nonspecific and can occur with a variety of   infectious and noninfectious processes. 3. Hepatic steatosis.    4. Nonspecific, mildly enlarged right paraesophageal lymph node. XR CHEST SNGL V   Final Result   Addendum 1 of 1   Addendum: Addendum/   impression:      Infrahilar disease on the right corresponds to mass seen on CT exam    reported   separately. Please see that report for further assessment pulmonary    findings. Final      CT CHEST WO CONT   Final Result   Impression:      1. 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 Follow-up   contrast enhanced CT of the chest recommended. 2.  Patchy bilateral airspace disease in both lungs consistent with reported   history of atypical viral infection/COVID pneumonia. 3.  Diffusely thickened esophageal wall of uncertain etiology and significance. 4.  Coronary atherosclerosis. 5.  Additional findings as above. 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 Plan to do EUS this Friday. Continue current treatment plan.      Patient discussed with Dr Tia Amato and agrees to above impression and plan    Thank you for allowing me to participate in this patients care    [facility] FNP-C  Active Problems:    Pneumonia due to COVID-19 virus (11/13/2020)      Sepsis ([address] (11/13/2020)         IP CONSULT TO PULMONOLOGY  IP CONSULT TO INFECTIOUS DISEASES  IP CONSULT TO ONCOLOGY  IP CONSULT TO GASTROENTEROLOGY    Cc Referring Physician   Other, MD Cosme

## 2020-11-18 LAB
APTT PPP: 36.3 SEC (ref 23–35.7)
D DIMER PPP FEU-MCNC: 2.74 UG/ML(FEU)
FIBRINOGEN PPP-MCNC: 461 MG/DL (ref 220–535)
GLUCOSE BLD STRIP.AUTO-MCNC: 269 MG/DL (ref 65–100)
GLUCOSE BLD STRIP.AUTO-MCNC: 294 MG/DL (ref 65–100)
GLUCOSE BLD STRIP.AUTO-MCNC: 330 MG/DL (ref 65–100)
GLUCOSE BLD STRIP.AUTO-MCNC: 359 MG/DL (ref 65–100)
INR PPP: 1.2 (ref 0.9–1.1)
PERFORMED BY, TECHID: ABNORMAL
PROTHROMBIN TIME: 14.8 SEC (ref 11.9–14.7)
THERAPEUTIC RANGE,PTTT: ABNORMAL SEC (ref 68–109)

## 2020-11-18 PROCEDURE — 77010033678 HC OXYGEN DAILY

## 2020-11-18 PROCEDURE — 82962 GLUCOSE BLOOD TEST: CPT

## 2020-11-18 PROCEDURE — 85379 FIBRIN DEGRADATION QUANT: CPT

## 2020-11-18 PROCEDURE — 74011250636 HC RX REV CODE- 250/636: Performed by: INTERNAL MEDICINE

## 2020-11-18 PROCEDURE — 36415 COLL VENOUS BLD VENIPUNCTURE: CPT

## 2020-11-18 PROCEDURE — 74011636637 HC RX REV CODE- 636/637: Performed by: INTERNAL MEDICINE

## 2020-11-18 PROCEDURE — 85610 PROTHROMBIN TIME: CPT

## 2020-11-18 PROCEDURE — 74011250637 HC RX REV CODE- 250/637: Performed by: INTERNAL MEDICINE

## 2020-11-18 PROCEDURE — 85730 THROMBOPLASTIN TIME PARTIAL: CPT

## 2020-11-18 PROCEDURE — 99231 SBSQ HOSP IP/OBS SF/LOW 25: CPT | Performed by: INTERNAL MEDICINE

## 2020-11-18 PROCEDURE — 65270000029 HC RM PRIVATE

## 2020-11-18 PROCEDURE — 94760 N-INVAS EAR/PLS OXIMETRY 1: CPT

## 2020-11-18 PROCEDURE — 85384 FIBRINOGEN ACTIVITY: CPT

## 2020-11-18 RX ADMIN — AZITHROMYCIN MONOHYDRATE 500 MG: 500 TABLET ORAL at 08:59

## 2020-11-18 RX ADMIN — DEXAMETHASONE SODIUM PHOSPHATE 6 MG: 4 INJECTION, SOLUTION INTRA-ARTICULAR; INTRALESIONAL; INTRAMUSCULAR; INTRAVENOUS; SOFT TISSUE at 00:42

## 2020-11-18 RX ADMIN — INSULIN LISPRO 11 UNITS: 100 INJECTION, SOLUTION INTRAVENOUS; SUBCUTANEOUS at 17:48

## 2020-11-18 RX ADMIN — MELATONIN TAB 5 MG 5 MG: 5 TAB at 22:33

## 2020-11-18 RX ADMIN — Medication 10 ML: at 06:10

## 2020-11-18 RX ADMIN — Medication 2 TABLET: at 08:59

## 2020-11-18 RX ADMIN — DEXAMETHASONE SODIUM PHOSPHATE 6 MG: 4 INJECTION, SOLUTION INTRA-ARTICULAR; INTRALESIONAL; INTRAMUSCULAR; INTRAVENOUS; SOFT TISSUE at 06:09

## 2020-11-18 RX ADMIN — INSULIN LISPRO 11 UNITS: 100 INJECTION, SOLUTION INTRAVENOUS; SUBCUTANEOUS at 11:47

## 2020-11-18 RX ADMIN — DEXAMETHASONE SODIUM PHOSPHATE 6 MG: 4 INJECTION, SOLUTION INTRA-ARTICULAR; INTRALESIONAL; INTRAMUSCULAR; INTRAVENOUS; SOFT TISSUE at 17:47

## 2020-11-18 RX ADMIN — INSULIN GLARGINE 35 UNITS: 100 INJECTION, SOLUTION SUBCUTANEOUS at 09:00

## 2020-11-18 RX ADMIN — Medication 10 ML: at 14:00

## 2020-11-18 RX ADMIN — DEXAMETHASONE SODIUM PHOSPHATE 6 MG: 4 INJECTION, SOLUTION INTRA-ARTICULAR; INTRALESIONAL; INTRAMUSCULAR; INTRAVENOUS; SOFT TISSUE at 11:47

## 2020-11-18 RX ADMIN — INSULIN LISPRO 11 UNITS: 100 INJECTION, SOLUTION INTRAVENOUS; SUBCUTANEOUS at 09:00

## 2020-11-18 RX ADMIN — ENOXAPARIN SODIUM 30 MG: 100 INJECTION SUBCUTANEOUS at 09:00

## 2020-11-18 RX ADMIN — DEXAMETHASONE SODIUM PHOSPHATE 6 MG: 4 INJECTION, SOLUTION INTRA-ARTICULAR; INTRALESIONAL; INTRAMUSCULAR; INTRAVENOUS; SOFT TISSUE at 22:33

## 2020-11-18 RX ADMIN — Medication 10 ML: at 22:37

## 2020-11-18 RX ADMIN — ENOXAPARIN SODIUM 30 MG: 100 INJECTION SUBCUTANEOUS at 22:34

## 2020-11-18 NOTE — PROGRESS NOTES
Patient received from [employer]; no distress noted; resting quietly in bed; will continue to monitor

## 2020-11-18 NOTE — PROGRESS NOTES
Patient transferred to [place] [address] room 216 for further management. Report was called to [address] [place] [address].  He left the unit in a wheel chair accompanied by RN at 9:05 pm.

## 2020-11-18 NOTE — PROGRESS NOTES
Progress Note    Patient: Marisol Contreras MRN: [id number]  SSN: [id number]    YOB: 1975  Age: 39 y.o. Sex: male      Admit Date: 11/12/2020    LOS: 5 days     Subjective:     Patient seen and examined. He continuously doing well. Denies SOB, pain. He wished to be discharge soon. CT chest showed a large paresophageal mass (incidental finding). Objective:     Vitals:    11/18/20 0750 11/18/20 0824 11/18/20 1255 11/18/20 1459   BP: (!) 104/48  (!) 99/54 109/66   Pulse: 63  71 68   Resp: 18 18 16   Temp: 97.4 °F (36.3 °C)  97.8 °F (36.6 °C) 98.4 °F (36.9 °C)   SpO2: 92% 93% 98% (!) 89%   Weight:       Height:            Intake and Output:  Current Shift: No intake/output data recorded. Last three shifts: 11/16 1901 - 11/18 0700  In: 240 [P.O.:240]  Out: -     Physical Exam:   Skin:  Extremities and face reveal no rashes. No valenuzela erythema. HEENT: Sclerae anicteric. Extra-occular muscles are intact. No abnormal pigmentation of the lips. The neck is supple. Cardiovascular: Regular rate and rhythm. Respiratory:  Comfortable breathing with no accessory muscle use. GI:  Abdomen nondistended, soft, and nontender. No enlargement of the liver or spleen. No masses palpable. Rectal:  Deferred  Musculoskeletal: Extremities have good range of motion. Neurological:  Gross memory appears intact. Patient is alert and oriented. Psychiatric:  Mood appears appropriate with judgement intact.   Lymphatic:  No visible adenopathy      Lab/Data Review:  Recent Results (from the past 24 hour(s))   GLUCOSE, POC    Collection Time: 11/17/20  4:56 PM   Result Value Ref Range    Glucose (POC) 368 (H) 65 - 100 mg/dL    Performed by Mimi Dandy    PROTHROMBIN TIME + INR    Collection Time: 11/17/20  5:00 PM   Result Value Ref Range    Prothrombin time 15.5 (H) 11.9 - 14.7 sec    INR 1.2 (H) 0.9 - 1.1     PTT    Collection Time: 11/17/20  5:00 PM   Result Value Ref Range    aPTT 39.2 (H) 23.0 - 35.7 sec aPTT, therapeutic range   68 - 109 sec   FIBRINOGEN    Collection Time: 11/17/20  5:00 PM   Result Value Ref Range    Fibrinogen 562 (H) 220 - 535 mg/dL   D DIMER    Collection Time: 11/17/20  5:00 PM   Result Value Ref Range    D DIMER 3.45 (H) <0.50 ug/ml(FEU)   GLUCOSE, POC    Collection Time: 11/17/20  7:52 PM   Result Value Ref Range    Glucose (POC) 370 (H) 65 - 100 mg/dL    Performed by Domenico Monet    GLUCOSE, POC    Collection Time: 11/18/20  6:08 AM   Result Value Ref Range    Glucose (POC) 330 (H) 65 - 100 mg/dL    Performed by [facility]    PROTHROMBIN TIME + INR    Collection Time: 11/18/20  6:09 AM   Result Value Ref Range    Prothrombin time 14.8 (H) 11.9 - 14.7 sec    INR 1.2 (H) 0.9 - 1.1     PTT    Collection Time: 11/18/20  6:09 AM   Result Value Ref Range    aPTT 36.3 (H) 23.0 - 35.7 sec    aPTT, therapeutic range   68 - 109 sec   FIBRINOGEN    Collection Time: 11/18/20  6:09 AM   Result Value Ref Range    Fibrinogen 461 220 - 535 mg/dL   D DIMER    Collection Time: 11/18/20  6:09 AM   Result Value Ref Range    D DIMER 2.74 (H) <0.50 ug/ml(FEU)   GLUCOSE, POC    Collection Time: 11/18/20 12:53 PM   Result Value Ref Range    Glucose (POC) 269 (H) 65 - 100 mg/dL    Performed by [address] [place] [address], POC    Collection Time: 11/18/20  3:03 PM   Result Value Ref Range    Glucose (POC) 294 (H) 65 - 100 mg/dL    Performed by [facility]               CT ABD W CONT   Final Result   IMPRESSION:   1. Homogeneous, low-density right paraesophageal mass as seen on prior CT. 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 This could be   further evaluated with endoscopic ultrasound. 2. Persistent perihilar and infrahilar airspace opacities with additional   peripheral groundglass opacities. While these imaging features can be seen with   Covid-19 pneumonia, they are nonspecific and can occur with a variety of   infectious and noninfectious processes.    3. Hepatic steatosis. 4. Nonspecific, mildly enlarged right paraesophageal lymph node. CT CHEST W CONT   Final Result   IMPRESSION:   1. Homogeneous, low-density right paraesophageal mass as seen on prior CT. 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 This could be   further evaluated with endoscopic ultrasound. 2. Persistent perihilar and infrahilar airspace opacities with additional   peripheral groundglass opacities. While these imaging features can be seen with   Covid-19 pneumonia, they are nonspecific and can occur with a variety of   infectious and noninfectious processes. 3. Hepatic steatosis. 4. Nonspecific, mildly enlarged right paraesophageal lymph node. XR CHEST SNGL V   Final Result   Addendum 1 of 1   Addendum: Addendum/   impression:      Infrahilar disease on the right corresponds to mass seen on CT exam    reported   separately. Please see that report for further assessment pulmonary    findings. Final      CT CHEST WO CONT   Final Result   Impression:      1. 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 Follow-up   contrast enhanced CT of the chest recommended. 2.  Patchy bilateral airspace disease in both lungs consistent with reported   history of atypical viral infection/COVID pneumonia. 3.  Diffusely thickened esophageal wall of uncertain etiology and significance. 4.  Coronary atherosclerosis. 5.  Additional findings as above. Assessment:     Active Problems:    Pneumonia due to COVID-19 virus (11/13/2020)      Sepsis ([address] (11/13/2020)        Plan:   Patient wished to be discharged. He is clinically stable. We can do EUS as an outpatient to evaluate paraesophageal mass. Patient discussed with Dr Luana Morales and agrees to above impression and plan. Thank you for allowing me to participate in this patients care    Signed By: Era Johnston.  SHAQ Elizondo     November 18, 2020    Patient seen and examined agree with impression and plan.   Jomar Schmid MD

## 2020-11-18 NOTE — PROGRESS NOTES
17:45 Dr. Jeanne Duval notified of blood sugar reading of 368, 10 units humalog ordered. States will change doses. IVF discontinued. As ordered.

## 2020-11-18 NOTE — PROGRESS NOTES
Progress Note    Patient: Reuben Maloney MRN: [id number]  SSN: [id number]    YOB: 1975  Age: 39 y.o. Sex: male      Admit Date: 11/12/2020    LOS: 5 days     Subjective:     59-year-old patient admitted with pneumonia initially COVID-19 positive repeat test was negative patient is on room air. Pulmonary notes have been reviewed    Objective:     Vitals:    11/18/20 0750 11/18/20 0824 11/18/20 1255 11/18/20 1459   BP: (!) 104/48  (!) 99/54 109/66   Pulse: 63  71 68   Resp: 18 18 16   Temp: 97.4 °F (36.3 °C)  97.8 °F (36.6 °C) 98.4 °F (36.9 °C)   SpO2: 92% 93% 98% (!) 89%   Weight:       Height:            Intake and Output:  Current Shift: No intake/output data recorded. Last three shifts: 11/16 1901 - 11/18 0700  In: 240 [P.O.:240]  Out: -     Physical Exam:   General:  Alert, cooperative, no distress, appears stated age. Eyes:  Conjunctivae/corneas clear. PERRL, EOMs intact. Fundi benign   Ears:  Normal TMs and external ear canals both ears. Nose: Nares normal. Septum midline. Mucosa normal. No drainage or sinus tenderness. Mouth/Throat: Lips, mucosa, and tongue normal. Teeth and gums normal.   Neck: Supple, symmetrical, trachea midline, no adenopathy, thyroid: no enlargment/tenderness/nodules, no carotid bruit and no JVD. Back:   Symmetric, no curvature. ROM normal. No CVA tenderness. Lungs:   Clear to auscultation bilaterally. Heart:  Regular rate and rhythm, S1, S2 normal, no murmur, click, rub or gallop. Abdomen:   Soft, non-tender. Bowel sounds normal. No masses,  No organomegaly. Extremities: Extremities normal, atraumatic, no cyanosis or edema. Pulses: 2+ and symmetric all extremities. Skin: Skin color, texture, turgor normal. No rashes or lesions   Lymph nodes: Cervical, supraclavicular, and axillary nodes normal.   Neurologic: CNII-XII intact. Normal strength, sensation and reflexes throughout. Lab/Data Review:   All lab results for the last 24 hours reviewed.      Recent Results (from the past 24 hour(s))   GLUCOSE, POC    Collection Time: 11/17/20  7:52 PM   Result Value Ref Range    Glucose (POC) 370 (H) 65 - 100 mg/dL    Performed by Teresa Velasquez    GLUCOSE, POC    Collection Time: 11/18/20  6:08 AM   Result Value Ref Range    Glucose (POC) 330 (H) 65 - 100 mg/dL    Performed by [facility]    PROTHROMBIN TIME + INR    Collection Time: 11/18/20  6:09 AM   Result Value Ref Range    Prothrombin time 14.8 (H) 11.9 - 14.7 sec    INR 1.2 (H) 0.9 - 1.1     PTT    Collection Time: 11/18/20  6:09 AM   Result Value Ref Range    aPTT 36.3 (H) 23.0 - 35.7 sec    aPTT, therapeutic range   68 - 109 sec   FIBRINOGEN    Collection Time: 11/18/20  6:09 AM   Result Value Ref Range    Fibrinogen 461 220 - 535 mg/dL   D DIMER    Collection Time: 11/18/20  6:09 AM   Result Value Ref Range    D DIMER 2.74 (H) <0.50 ug/ml(FEU)   GLUCOSE, POC    Collection Time: 11/18/20 12:53 PM   Result Value Ref Range    Glucose (POC) 269 (H) 65 - 100 mg/dL    Performed by [address] [place] [address] Road, POC    Collection Time: 11/18/20  3:03 PM   Result Value Ref Range    Glucose (POC) 294 (H) 65 - 100 mg/dL    Performed by Mono PIERRE          Assessment:     Active Problems:    Pneumonia due to COVID-19 virus (11/13/2020)      Sepsis ([address] (11/13/2020)      Paraesophageal mass for EUS    Plan:     Possible discharge on Friday after procedure  D/W Patient  Signed By: Kurtis Cooper MD     November 18, 2020

## 2020-11-18 NOTE — PROGRESS NOTES
Pulmonology and Critical Care Progress Note    Subjective:     Chief Complaint:   Chief Complaint   Patient presents with    Chest Pain          Patient seen and examined in his room this morning in his new room on 2E. No acute events overnight. I discussed the case in detail with the patient at the bedside. His repeat COVID-19 test is negative. Lying in bed comfortably  Awake and alert  On room air and saturating between 91 and 95%. Patient admits that his respiratory status is well controlled. No acute distress  GI following and are planning an EUS on Friday 11/20/20. Review of Systems:  A comprehensive review of systems was negative except for that written in the HPI.     Current Facility-Administered Medications   Medication Dose Route Frequency Provider Last Rate Last Dose    insulin glargine (LANTUS) injection 35 Units  35 Units SubCUTAneous DAILY Julia ERAZO MD        glucose chewable tablet 16 g  4 Tab Oral PRN Julia ERAZO MD        dextrose (D50W) injection syrg 12.5-25 g  25-50 mL IntraVENous PRN Julia ERAZO MD        glucagon (GLUCAGEN) injection 1 mg  1 mg IntraMUSCular PRN Julia ERAZO MD        insulin lispro (HUMALOG) injection 11 Units  0.1 Units/kg SubCUTAneous TID WITH MEALS Reynold Thornton MD        melatonin tablet 5 mg  5 mg Oral QHS Julia ERAZO MD   5 mg at 11/17/20 2100    enoxaparin (LOVENOX) injection 30 mg  30 mg SubCUTAneous Q12H Julia ERAZO MD   30 mg at 11/17/20 2059    acetaminophen (TYLENOL) tablet 650 mg  650 mg Oral Q6H PRN Julia ERAZO MD   650 mg at 11/13/20 2343    Or    acetaminophen (TYLENOL) suppository 650 mg  650 mg Rectal Q6H PRN Shashi Henning MD        cholecalciferol (VITAMIN D3) (1000 Units /25 mcg) tablet 2 Tab  2,000 Units Oral DAILY Shashi Henning MD   2 Tab at 11/17/20 0820    guaiFENesin-dextromethorphan (ROBITUSSIN DM) 100-10 mg/5 mL syrup 5 mL  5 mL Oral Q4H PRN Shashi Henning MD       [address] [place] [address] sodium chloride (NS) flush 5-40 mL  5-40 mL IntraVENous Q8H Reynold Thornton MD   10 mL at 20 0610    sodium chloride (NS) flush 5-40 mL  5-40 mL IntraVENous PRN April ERAZO MD        bisacodyL (DULCOLAX) tablet 5 mg  5 mg Oral DAILY PRN April ERAZO MD        dexamethasone (DECADRON) 4 mg/mL injection 6 mg  6 mg IntraVENous Q6H Kylee Perrin MD   6 mg at 20 0609    benzonatate (TESSALON) capsule 100 mg  100 mg Oral TID PRN Yari Paul MD   100 mg at 20 2343    azithromycin (ZITHROMAX) tablet 500 mg  500 mg Oral DAILY Curtis Mejia MD   500 mg at 20 0819            No Known Allergies        Objective:     Blood pressure (!) 104/48, pulse 63, temperature 97.4 °F (36.3 °C), resp. rate 18, height 5' 10\" (1.778 m), weight 110 kg (242 lb 8.1 oz), SpO2 92 %. Temp (24hrs), Av.5 °F (36.4 °C), Min:96.7 °F (35.9 °C), Max:98.2 °F (36.8 °C)      Intake and Output:  Current Shift: No intake/output data recorded. Last 3 Shifts:  1901 -  0700  In: 240 [P.O.:240]  Out: -     Physical Exam:    General: Lying in bed comfortably, no acute distress  Eye: Reactive, symmetric  Throat and Neck: Supple  Lung: Reduced air entry bilaterally with prolonged exhalation but no wheezing. Occasional crackles. On room air. Heart: S1+S2. No murmurs  Abdomen: soft, non-tender. Bowel sounds normal. No masses; obese  Extremities: No edema  : Not done  Skin: No cyanosis  Neurologic: A & O x3.   Grossly nonfocal  Psychiatric: Appropriate affect; coherent    Lab/Data Review:  Recent Results (from the past 24 hour(s))   GLUCOSE, POC    Collection Time: 20 11:19 AM   Result Value Ref Range    Glucose (POC) 311 (H) 65 - 100 mg/dL    Performed by Georgianna Hammans    GLUCOSE, POC    Collection Time: 20  4:56 PM   Result Value Ref Range    Glucose (POC) 368 (H) 65 - 100 mg/dL    Performed by Patrick Stack    PROTHROMBIN TIME + INR    Collection Time: 20  5:00 PM Result Value Ref Range    Prothrombin time 15.5 (H) 11.9 - 14.7 sec    INR 1.2 (H) 0.9 - 1.1     PTT    Collection Time: 11/17/20  5:00 PM   Result Value Ref Range    aPTT 39.2 (H) 23.0 - 35.7 sec    aPTT, therapeutic range   68 - 109 sec   FIBRINOGEN    Collection Time: 11/17/20  5:00 PM   Result Value Ref Range    Fibrinogen 562 (H) 220 - 535 mg/dL   D DIMER    Collection Time: 11/17/20  5:00 PM   Result Value Ref Range    D DIMER 3.45 (H) <0.50 ug/ml(FEU)   GLUCOSE, POC    Collection Time: 11/17/20  7:52 PM   Result Value Ref Range    Glucose (POC) 370 (H) 65 - 100 mg/dL    Performed by Gibran Chou    GLUCOSE, POC    Collection Time: 11/18/20  6:08 AM   Result Value Ref Range    Glucose (POC) 330 (H) 65 - 100 mg/dL    Performed by ADEOLA GILBERT      chest X-ray      CT ABD W CONT   Final Result   IMPRESSION:   1. Homogeneous, low-density right paraesophageal mass as seen on prior CT. 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 This could be   further evaluated with endoscopic ultrasound. 2. Persistent perihilar and infrahilar airspace opacities with additional   peripheral groundglass opacities. While these imaging features can be seen with   Covid-19 pneumonia, they are nonspecific and can occur with a variety of   infectious and noninfectious processes. 3. Hepatic steatosis. 4. Nonspecific, mildly enlarged right paraesophageal lymph node. CT CHEST W CONT   Final Result   IMPRESSION:   1. Homogeneous, low-density right paraesophageal mass as seen on prior CT. 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 This could be   further evaluated with endoscopic ultrasound. 2. Persistent perihilar and infrahilar airspace opacities with additional   peripheral groundglass opacities. While these imaging features can be seen with   Covid-19 pneumonia, they are nonspecific and can occur with a variety of   infectious and noninfectious processes.    3. Hepatic steatosis. 4. Nonspecific, mildly enlarged right paraesophageal lymph node. XR CHEST SNGL V   Final Result   Addendum 1 of 1   Addendum: Addendum/   impression:      Infrahilar disease on the right corresponds to mass seen on CT exam    reported   separately. Please see that report for further assessment pulmonary    findings. Final      CT CHEST WO CONT   Final Result   Impression:      1. 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 Follow-up   contrast enhanced CT of the chest recommended. 2.  Patchy bilateral airspace disease in both lungs consistent with reported   history of atypical viral infection/COVID pneumonia. 3.  Diffusely thickened esophageal wall of uncertain etiology and significance. 4.  Coronary atherosclerosis. 5.  Additional findings as above. CT Results  (Last 48 hours)    None          Assessment:     1. Sepsis  2. COVID-19 infection  3. Paraesophageal mass  4. LISA    Plan:     1.)  Sepsis:  Patient has developed sepsis from COVID-19 infection. On azithromycin, Zosyn stopped by ID. Likely can stop Azithromycin tomorrow. Lactate and white blood cell count normalized. Sputum culture with normal edita. 2.)  COVID-19 infection:  He has been confirmed to have COVID-19 infection, repeat test is now negative. Repeat CT scan of the chest showing persistent perihilar and infrahilar airspace opacities with additional peripheral groundglass opacities. However, the patient is doing very well on room air currently. Received Actemra. Status post remdesivir, being continued on Decadron (recommend a total of 10 days or can stop on the day of discharge). Continue azithromycin, but likely does not need more than 7 days. Likely can stop after tomorrow's doses.     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  Appreciate GI evaluation; patient is now doing very well on room air without much in terms of respiratory symptoms. He is also now COVID-19 negative. From a pulmonary perspective, I see no issue with undergoing endoscopic evaluation. GI is planning EUS on 11/20/20.    4.)  LISA:  Improved after his IV hydration. Questions of patient were answered at bedside in detail  Case discussed in detail with RN, RT, and care team    Thank you for involving me in the care of this patient  I will follow with you closely during hospitalization    Time spent more than 30 minutes in direct patient care with no overlap reviewing results and records, decision-making, and answering questions. Bety Reyez DO  [facility]  11/18/2020  3:01 PM    This documentation was prepared using voice recognition software. Typographical errors may occur.   Attempt has been made to fix those errors however inadvertent errors may persist

## 2020-11-18 NOTE — PROGRESS NOTES
Infectious Disease Progress Note           Subjective:   Doing well, denies new complaints, No acute events since last seen   Objective:   Physical Exam:     Visit Vitals  BP (!) 104/48 (BP 1 Location: Right arm, BP Patient Position: At rest)   Pulse 63   Temp 97.4 °F (36.3 °C)   Resp 18   Ht 5' 10\" (1.778 m)   Wt 110 kg (242 lb 8.1 oz)   SpO2 93%   BMI 34.80 kg/m²    O2 Flow Rate (L/min): 2 l/min O2 Device: Room air    Temp (24hrs), Av.5 °F (36.4 °C), Min:96.7 °F (35.9 °C), Max:98.2 °F (36.8 °C)    No intake/output data recorded.  1901 -  0700  In: 240 [P.O.:240]  Out: -     General: NAD, AAO x 4  HEENT: GIDEON, Moist mucosa   Lungs: Decreased at the bases, no wheeze/rhonchi   Heart: S1S2+, RRR, no murmur  Abdo: Soft, NT, ND, +BS   Exts: No edema, + pulses b/l   Skin: No wounds, No rashes or lesions    Data Review:       Recent Days:  Recent Labs     20  0410   WBC 10.4   HGB 12.7   HCT 36.7        No results for input(s): BUN, CREA in the last 72 hours. Microbiology   - Blood Cx : Neg     Diagnostics   CXR Results  (Last 48 hours)    None        CT chest/abdo/pel 11/15: Homogeneous, low-density right paraesophageal mass as seen on prior CT. 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 This could be further evaluated with endoscopic ultrasound. Persistent perihilar and infrahilar airspace opacities with additional peripheral groundglass opacities. While these imaging features can be seen with Covid-19 pneumonia, they are nonspecific and can occur with a variety of infectious and noninfectious processes. Assessment/Plan     1. COVID-19 w pneumonitis: Tested positive as out pt on       Patchy b/l infiltrates on CT chest. Remains on RA, denies productive cough      On day # 5 of Azithromycin and Decadron       Repeat test for COVID-19 is neg. No need for continued antimicrobial coverage upon d/c , taper steroid therapy    2.  Large paraesophageal mass: Incidental finding on CT chest         Mass appears cystic on contrast enhanced CT      GI planning on EUS on 11/20, pt wants done as out pt      3. Acute renal failure: Cr 1.48, Cr down to 0.77 on 11/14    4. Diarrhea: Resolved     5.  DM: Recently diagnosed, A1C 12.7, BS mgt per primary     Dispo: Cleared for d/c from ID stand point      Rowena Yuan MD    11/18/2020

## 2020-11-19 LAB
APTT PPP: 31 SEC (ref 23–35.7)
D DIMER PPP FEU-MCNC: 2.45 UG/ML(FEU)
FIBRINOGEN PPP-MCNC: 338 MG/DL (ref 220–535)
GLUCOSE BLD STRIP.AUTO-MCNC: 274 MG/DL (ref 65–100)
GLUCOSE BLD STRIP.AUTO-MCNC: 352 MG/DL (ref 65–100)
GLUCOSE BLD STRIP.AUTO-MCNC: 443 MG/DL (ref 65–100)
GLUCOSE BLD STRIP.AUTO-MCNC: 89 MG/DL (ref 65–100)
INR PPP: 1.2 (ref 0.9–1.1)
PERFORMED BY, TECHID: ABNORMAL
PERFORMED BY, TECHID: NORMAL
PROTHROMBIN TIME: 14.8 SEC (ref 11.9–14.7)
THERAPEUTIC RANGE,PTTT: NORMAL SEC (ref 68–109)

## 2020-11-19 PROCEDURE — 82962 GLUCOSE BLOOD TEST: CPT

## 2020-11-19 PROCEDURE — 36415 COLL VENOUS BLD VENIPUNCTURE: CPT

## 2020-11-19 PROCEDURE — 94760 N-INVAS EAR/PLS OXIMETRY 1: CPT

## 2020-11-19 PROCEDURE — 85379 FIBRIN DEGRADATION QUANT: CPT

## 2020-11-19 PROCEDURE — 85730 THROMBOPLASTIN TIME PARTIAL: CPT

## 2020-11-19 PROCEDURE — 74011250636 HC RX REV CODE- 250/636: Performed by: INTERNAL MEDICINE

## 2020-11-19 PROCEDURE — 65270000029 HC RM PRIVATE

## 2020-11-19 PROCEDURE — 85384 FIBRINOGEN ACTIVITY: CPT

## 2020-11-19 PROCEDURE — 85610 PROTHROMBIN TIME: CPT

## 2020-11-19 PROCEDURE — 74011250637 HC RX REV CODE- 250/637: Performed by: INTERNAL MEDICINE

## 2020-11-19 PROCEDURE — 74011636637 HC RX REV CODE- 636/637: Performed by: INTERNAL MEDICINE

## 2020-11-19 RX ORDER — DEXAMETHASONE 4 MG/1
6 TABLET ORAL EVERY 12 HOURS
Status: DISCONTINUED | OUTPATIENT
Start: 2020-11-19 | End: 2020-11-20

## 2020-11-19 RX ORDER — INSULIN LISPRO 100 [IU]/ML
13 INJECTION, SOLUTION INTRAVENOUS; SUBCUTANEOUS
Status: DISCONTINUED | OUTPATIENT
Start: 2020-11-19 | End: 2020-11-19

## 2020-11-19 RX ORDER — INSULIN GLARGINE 100 [IU]/ML
45 INJECTION, SOLUTION SUBCUTANEOUS DAILY
Status: DISCONTINUED | OUTPATIENT
Start: 2020-11-20 | End: 2020-11-19

## 2020-11-19 RX ORDER — INSULIN GLARGINE 100 [IU]/ML
50 INJECTION, SOLUTION SUBCUTANEOUS DAILY
Status: DISCONTINUED | OUTPATIENT
Start: 2020-11-20 | End: 2020-11-20 | Stop reason: HOSPADM

## 2020-11-19 RX ORDER — INSULIN LISPRO 100 [IU]/ML
15 INJECTION, SOLUTION INTRAVENOUS; SUBCUTANEOUS
Status: DISCONTINUED | OUTPATIENT
Start: 2020-11-19 | End: 2020-11-20 | Stop reason: HOSPADM

## 2020-11-19 RX ADMIN — INSULIN LISPRO 15 UNITS: 100 INJECTION, SOLUTION INTRAVENOUS; SUBCUTANEOUS at 16:51

## 2020-11-19 RX ADMIN — Medication 10 ML: at 05:17

## 2020-11-19 RX ADMIN — INSULIN LISPRO 13 UNITS: 100 INJECTION, SOLUTION INTRAVENOUS; SUBCUTANEOUS at 13:16

## 2020-11-19 RX ADMIN — Medication 2 TABLET: at 08:21

## 2020-11-19 RX ADMIN — ENOXAPARIN SODIUM 30 MG: 100 INJECTION SUBCUTANEOUS at 08:24

## 2020-11-19 RX ADMIN — MELATONIN TAB 5 MG 5 MG: 5 TAB at 23:22

## 2020-11-19 RX ADMIN — INSULIN LISPRO 11 UNITS: 100 INJECTION, SOLUTION INTRAVENOUS; SUBCUTANEOUS at 08:20

## 2020-11-19 RX ADMIN — Medication 10 ML: at 23:25

## 2020-11-19 RX ADMIN — INSULIN GLARGINE 35 UNITS: 100 INJECTION, SOLUTION SUBCUTANEOUS at 08:21

## 2020-11-19 RX ADMIN — Medication 10 ML: at 13:17

## 2020-11-19 RX ADMIN — DEXAMETHASONE 6 MG: 4 TABLET ORAL at 23:22

## 2020-11-19 RX ADMIN — AZITHROMYCIN MONOHYDRATE 500 MG: 500 TABLET ORAL at 08:21

## 2020-11-19 RX ADMIN — ENOXAPARIN SODIUM 30 MG: 100 INJECTION SUBCUTANEOUS at 23:22

## 2020-11-19 RX ADMIN — DEXAMETHASONE SODIUM PHOSPHATE 6 MG: 4 INJECTION, SOLUTION INTRA-ARTICULAR; INTRALESIONAL; INTRAMUSCULAR; INTRAVENOUS; SOFT TISSUE at 05:17

## 2020-11-19 NOTE — PROGRESS NOTES
Comprehensive Nutrition Assessment    Type and Reason for Visit: RD nutrition re-screen/LOS    Nutrition Recommendations/Plan:     Continue Diabetic diet  Honor pt preferences within [facility] restrictions    [facility] of [facility] [place] to document PO intakes, BMs in EMR  Obtain updated measured wt    Nutrition Assessment:  Admitted for PNA 2/2 COVID+. CT chest (11/15) showed infiltrates and paraesophageal mass. Now COVID negative. Scheduled for EUS procedure tomorrow. Ordered Diabetic diet. Appetite documented as good, no intakes recorded. Spoke to RN who relayed pt with good intakes inpatient. Spoke to pt at bedside who also confirmed appetite regained since beginning of admit, now at 100% for each meal. No nutrition concerns at this time. Labs and meds reviewed. Malnutrition Assessment:  Malnutrition Status:  No malnutrition      Nutrition Related Findings:  Appears nourished, obese. Pt denied c/s difficulty, n/v or c/d. Last BM today. No edema recorded. Wounds:    None       Current Nutrition Therapies:  DIET DIABETIC CONSISTENT CARB Regular    Anthropometric Measures:  · Height:  5' 10\" (177.8 cm)  · Current Body Wt:  110 kg (242 lb 8.1 oz)   · Usual Body Wt:  127 kg (280 lb)(Pt states -285; unsure when last weighed this amount)     · Ideal Body Wt:  166 lbs:  146.1 %   · BMI Category:  Obese class 1 (BMI 30.0-34.9)     No wt hx available to assess. Will monitor inpatient wt trends. Denied recent wt changes.     Nutrition Diagnosis:   No nutrition diagnosis at this time     Nutrition Interventions:   Food and/or Nutrient Delivery: Continue current diet  Nutrition Education and Counseling: Education completed  Coordination of Nutrition Care: Continue to monitor while inpatient    Nutrition Monitoring and Evaluation:   Behavioral-Environmental Outcomes: None identified  Food/Nutrient Intake Outcomes: Food and nutrient intake  Physical Signs/Symptoms Outcomes: Weight    Discharge Planning:    No discharge needs at this time Electronically signed by Daina Griffin on 11/19/2020 at 5:26 PM    Contact: [phone]

## 2020-11-19 NOTE — PROGRESS NOTES
Progress Note    Patient: Akua Ortez MRN: [id number]  SSN: [id number]    YOB: 1975  Age: 39 y.o. Sex: male      Admit Date: 11/12/2020    LOS: 6 days     Subjective:     Patient awake and alert, not in acute distress. On room air. Denies SOB. GI was consulted for incidental finding of paraesophageal mass from CT chest. He denies chest pain, nausea, vomiting, or odynophagia. He is tolerating his diet well. Wanting to be discharge soon. Objective:     Vitals:    11/19/20 0320 11/19/20 0729 11/19/20 1232 11/19/20 1418   BP: 106/67 110/61 (!) 108/59    Pulse: 64 (!) 59 84    Resp: 16 16 18    Temp: 97.5 °F (36.4 °C) 97.6 °F (36.4 °C) 97.7 °F (36.5 °C)    SpO2: 92% 92% 92% 92%   Weight:       Height:            Intake and Output:  Current Shift: No intake/output data recorded. Last three shifts: 11/17 1901 - 11/19 0700  In: 240 [P.O.:240]  Out: -     Physical Exam:   Skin:  Extremities and face reveal no rashes. No valenzuela erythema. HEENT: Sclerae anicteric. Extra-occular muscles are intact. No abnormal pigmentation of the lips. The neck is supple. Cardiovascular: Regular rate and rhythm. Respiratory:  Comfortable breathing with no accessory muscle use. GI:  Abdomen nondistended, soft, and nontender. No enlargement of the liver or spleen. No masses palpable. Rectal:  Deferred  Musculoskeletal: Extremities have good range of motion. Neurological:  Gross memory appears intact. Patient is alert and oriented. Psychiatric:  Mood appears appropriate with judgement intact.   Lymphatic:  No visible adenopathy      Lab/Data Review:  Recent Results (from the past 24 hour(s))   GLUCOSE, POC    Collection Time: 11/18/20  3:03 PM   Result Value Ref Range    Glucose (POC) 294 (H) 65 - 100 mg/dL    Performed by [address], POC    Collection Time: 11/18/20 10:35 PM   Result Value Ref Range    Glucose (POC) 359 (H) 65 - 100 mg/dL    Performed by [facility] PROTHROMBIN TIME + INR    Collection Time: 11/19/20  6:14 AM   Result Value Ref Range    Prothrombin time 14.8 (H) 11.9 - 14.7 sec    INR 1.2 (H) 0.9 - 1.1     PTT    Collection Time: 11/19/20  6:14 AM   Result Value Ref Range    aPTT 31.0 23.0 - 35.7 sec    aPTT, therapeutic range   68 - 109 sec   FIBRINOGEN    Collection Time: 11/19/20  6:14 AM   Result Value Ref Range    Fibrinogen 338 220 - 535 mg/dL   D DIMER    Collection Time: 11/19/20  6:14 AM   Result Value Ref Range    D DIMER 2.45 (H) <0.50 ug/ml(FEU)   GLUCOSE, POC    Collection Time: 11/19/20  7:35 AM   Result Value Ref Range    Glucose (POC) 352 (H) 65 - 100 mg/dL    Performed by [employer] [address], POC    Collection Time: 11/19/20 12:38 PM   Result Value Ref Range    Glucose (POC) 443 (H) 65 - 100 mg/dL    Performed by [facility]               CT ABD W CONT   Final Result   IMPRESSION:   1. Homogeneous, low-density right paraesophageal mass as seen on prior CT. 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 This could be   further evaluated with endoscopic ultrasound. 2. Persistent perihilar and infrahilar airspace opacities with additional   peripheral groundglass opacities. While these imaging features can be seen with   Covid-19 pneumonia, they are nonspecific and can occur with a variety of   infectious and noninfectious processes. 3. Hepatic steatosis. 4. Nonspecific, mildly enlarged right paraesophageal lymph node. CT CHEST W CONT   Final Result   IMPRESSION:   1. Homogeneous, low-density right paraesophageal mass as seen on prior CT. 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 This could be   further evaluated with endoscopic ultrasound. 2. Persistent perihilar and infrahilar airspace opacities with additional   peripheral groundglass opacities.  While these imaging features can be seen with   Covid-19 pneumonia, they are nonspecific and can occur with a variety of infectious and noninfectious processes. 3. Hepatic steatosis. 4. Nonspecific, mildly enlarged right paraesophageal lymph node. XR CHEST SNGL V   Final Result   Addendum 1 of 1   Addendum: Addendum/   impression:      Infrahilar disease on the right corresponds to mass seen on CT exam    reported   separately. Please see that report for further assessment pulmonary    findings. Final      CT CHEST WO CONT   Final Result   Impression:      1. 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 Follow-up   contrast enhanced CT of the chest recommended. 2.  Patchy bilateral airspace disease in both lungs consistent with reported   history of atypical viral infection/COVID pneumonia. 3.  Diffusely thickened esophageal wall of uncertain etiology and significance. 4.  Coronary atherosclerosis. 5.  Additional findings as above. Assessment:     Active Problems:    Pneumonia due to COVID-19 virus (11/13/2020)      Sepsis ([facility] [address] (11/13/2020)        Plan: We plan to proceed with the original plan of doing EUS tomorrow to evaluate paraesophageal mass instead of doing it as outpatient. Keep patient NPO post MN. Patient is hoping to be discharge tomorrow after the procedure. Patient discussed with Dr Iqra Roberts and agrees to above impression and plan    Thank you for allowing me to participate in this patients care    Signed By: Marcio Elizondo NP     November 19, 2020       Patient seen and examined agree with impression and plan.   Chevy Drake MD

## 2020-11-19 NOTE — PROGRESS NOTES
Pulmonology and Critical Care Progress Note    Subjective:     Chief Complaint:   Chief Complaint   Patient presents with    Chest Pain      Patient seen and examined in his room this morning. No acute events overnight. I discussed the case in detail with the patient at the bedside. His repeat COVID-19 test is negative. Lying in bed comfortably; awake and alert  On room air and saturating well. Patient admits that his respiratory status is well controlled. No acute distress  GI following and were planning an EUS on Friday, but he is asking to go home. Per their note from yesterday, he is okay to be discharged home and they will perform an EUS as an outpatient. Review of Systems:  A comprehensive review of systems was negative except for that written in the HPI.     Current Facility-Administered Medications   Medication Dose Route Frequency Provider Last Rate Last Dose    dexAMETHasone (DECADRON) tablet 6 mg  6 mg Oral Q12H Florencio Adhikari DO        insulin glargine (LANTUS) injection 35 Units  35 Units SubCUTAneous DAILY Mary ERAZO MD   35 Units at 11/19/20 3932    glucose chewable tablet 16 g  4 Tab Oral PRN Mary ERAZO MD        dextrose (D50W) injection syrg 12.5-25 g  25-50 mL IntraVENous PRN Mary ERAZO MD        glucagon (GLUCAGEN) injection 1 mg  1 mg IntraMUSCular PRN Mary ERAZO MD        insulin lispro (HUMALOG) injection 11 Units  0.1 Units/kg SubCUTAneous TID WITH MEALS Mary ERAZO MD   11 Units at 11/19/20 0820    melatonin tablet 5 mg  5 mg Oral QHS Mary ERAZO MD   5 mg at 11/18/20 2233    enoxaparin (LOVENOX) injection 30 mg  30 mg SubCUTAneous Q12H Mary ERAZO MD   30 mg at 11/19/20 2300    acetaminophen (TYLENOL) tablet 650 mg  650 mg Oral Q6H PRN Mary ERAZO MD   650 mg at 11/13/20 2343    Or    acetaminophen (TYLENOL) suppository 650 mg  650 mg Rectal Q6H PRN Nithya Martinez MD        cholecalciferol (VITAMIN D3) (1000 Units /25 mcg) tablet 2 Tab  2,000 Units Oral DAILY Lorelei Lewis MD   2 Tab at 20 0821    guaiFENesin-dextromethorphan (ROBITUSSIN DM) 100-10 mg/5 mL syrup 5 mL  5 mL Oral Q4H PRN Mansi ERAZO MD        sodium chloride (NS) flush 5-40 mL  5-40 mL IntraVENous Q8H Reynold Thornton MD   10 mL at 20 0517    sodium chloride (NS) flush 5-40 mL  5-40 mL IntraVENous PRN Mansi ERAZO MD        bisacodyL (DULCOLAX) tablet 5 mg  5 mg Oral DAILY PRN Lorelei Lewis MD        benzonatate (TESSALON) capsule 100 mg  100 mg Oral TID PRN Fiona Billingsley MD   100 mg at 20 2343    azithromycin (ZITHROMAX) tablet 500 mg  500 mg Oral DAILY Mansi ERAZO MD   500 mg at 20 2821            No Known Allergies        Objective:     Blood pressure 110/61, pulse (!) 59, temperature 97.6 °F (36.4 °C), resp. rate 16, height 5' 10\" (1.778 m), weight 110 kg (242 lb 8.1 oz), SpO2 92 %. Temp (24hrs), Av.8 °F (36.6 °C), Min:97.5 °F (36.4 °C), Max:98.4 °F (36.9 °C)      Intake and Output:  Current Shift: No intake/output data recorded. Last 3 Shifts:  1901 -  0700  In: 240 [P.O.:240]  Out: -     Physical Exam:    General: Lying in bed comfortably, no acute distress  Eye: Reactive, symmetric  Throat and Neck: Supple  Lung: Reduced air entry bilaterally with prolonged exhalation but no wheezing. Occasional crackles. On room air. Heart: S1+S2. No murmurs  Abdomen: soft, non-tender. Bowel sounds normal. No masses; obese  Extremities: No edema  : Not done  Skin: No cyanosis  Neurologic: A & O x3.   Grossly nonfocal  Psychiatric: Appropriate affect; coherent    Lab/Data Review:  Recent Results (from the past 24 hour(s))   GLUCOSE, POC    Collection Time: 20 12:53 PM   Result Value Ref Range    Glucose (POC) 269 (H) 65 - 100 mg/dL    Performed by [address] [place] [address], [facility]    Collection Time: 20  3:03 PM   Result Value Ref Range    Glucose (POC) 294 (H) 65 - 100 mg/dL Performed by Clifford Perry POC    Collection Time: 11/18/20 10:35 PM   Result Value Ref Range    Glucose (POC) 359 (H) 65 - 100 mg/dL    Performed by [address] [place] [address]  + INR    Collection Time: 11/19/20  6:14 AM   Result Value Ref Range    Prothrombin time 14.8 (H) 11.9 - 14.7 sec    INR 1.2 (H) 0.9 - 1.1     PTT    Collection Time: 11/19/20  6:14 AM   Result Value Ref Range    aPTT 31.0 23.0 - 35.7 sec    aPTT, therapeutic range   68 - 109 sec   FIBRINOGEN    Collection Time: 11/19/20  6:14 AM   Result Value Ref Range    Fibrinogen 338 220 - 535 mg/dL   D DIMER    Collection Time: 11/19/20  6:14 AM   Result Value Ref Range    D DIMER 2.45 (H) <0.50 ug/ml(FEU)   GLUCOSE, POC    Collection Time: 11/19/20  7:35 AM   Result Value Ref Range    Glucose (POC) 352 (H) 65 - 100 mg/dL    Performed by Gianfranco Rogers      chest X-ray      CT ABD W CONT   Final Result   IMPRESSION:   1. Homogeneous, low-density right paraesophageal mass as seen on prior CT. 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 This could be   further evaluated with endoscopic ultrasound. 2. Persistent perihilar and infrahilar airspace opacities with additional   peripheral groundglass opacities. While these imaging features can be seen with   Covid-19 pneumonia, they are nonspecific and can occur with a variety of   infectious and noninfectious processes. 3. Hepatic steatosis. 4. Nonspecific, mildly enlarged right paraesophageal lymph node. CT CHEST W CONT   Final Result   IMPRESSION:   1. Homogeneous, low-density right paraesophageal mass as seen on prior CT. 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 This could be   further evaluated with endoscopic ultrasound. 2. Persistent perihilar and infrahilar airspace opacities with additional   peripheral groundglass opacities.  While these imaging features can be seen with   Covid-19 pneumonia, they are nonspecific and can occur with a variety of   infectious and noninfectious processes. 3. Hepatic steatosis. 4. Nonspecific, mildly enlarged right paraesophageal lymph node. XR CHEST SNGL V   Final Result   Addendum 1 of 1   Addendum: Addendum/   impression:      Infrahilar disease on the right corresponds to mass seen on CT exam    reported   separately. Please see that report for further assessment pulmonary    findings. Final      CT CHEST WO CONT   Final Result   Impression:      1. 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 Follow-up   contrast enhanced CT of the chest recommended. 2.  Patchy bilateral airspace disease in both lungs consistent with reported   history of atypical viral infection/COVID pneumonia. 3.  Diffusely thickened esophageal wall of uncertain etiology and significance. 4.  Coronary atherosclerosis. 5.  Additional findings as above. CT Results  (Last 48 hours)    None          Assessment:     1. Sepsis  2. COVID-19 infection  3. Paraesophageal mass  4. LISA    Plan:     1.)  Sepsis:  Patient has developed sepsis from COVID-19 infection. On azithromycin, Zosyn stopped by ID. Can stop Azithromycin today after his dose (day 7/7). Lactate and white blood cell count normalized. Sputum culture with normal edita. 2.)  COVID-19 infection:  He has been confirmed to have COVID-19 infection, repeat test is now negative. Repeat CT scan of the chest showing persistent perihilar and infrahilar airspace opacities with additional peripheral groundglass opacities. However, the patient is doing very well on room air currently. Received Actemra. Status post remdesivir  Decrease Decadron to 6 mg PO Bid as he had been on 6 mg IV q6. But if he is discharged today, Decadron can be stopped. Likely will have a significant positive effect on his hyperglycemia. D/C azithromycin after today's dose.     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  Appreciate GI evaluation; patient is now doing very well on room air without much in terms of respiratory symptoms. He is also now COVID-19 negative. From a pulmonary perspective, I see no issue with undergoing endoscopic evaluation. GI is planning EUS as an outpatient now. Okay for discharge home from a Pulmonary perspective. 4.)  LISA:  Improved after his IV hydration. Questions of patient were answered at bedside in detail  Case discussed in detail with RN, RT, and care team    Thank you for involving me in the care of this patient  I will follow with you closely during hospitalization    Time spent more than 30 minutes in direct patient care with no overlap reviewing results and records, decision-making, and answering questions. Bety Reyez DO  [facility]  11/19/2020  3:01 PM    This documentation was prepared using voice recognition software. Typographical errors may occur.   Attempt has been made to fix those errors however inadvertent errors may persist

## 2020-11-19 NOTE — PROGRESS NOTES
CM received a call from SILVANO with [facility] requesting information on the patients stay. SILVANO explained that per the note, patient should discharge tomorrow post procedure. BC CM verbalized understanding.

## 2020-11-19 NOTE — PROGRESS NOTES
Progress Note    Patient: Taj Linda MRN: [id number]  SSN: [id number]    YOB: 1975  Age: 39 y.o. Sex: male      Admit Date: 11/12/2020    LOS: 6 days     Subjective:     66-year-old patient admitted with pneumonia initially COVID-19 positive repeat test was negative patient is on room air. Pulmonary notes have been reviewed    Objective:     Vitals:    11/19/20 0729 11/19/20 1232 11/19/20 1418 11/19/20 1554   BP: 110/61 (!) 108/59  (!) 105/59   Pulse: (!) 59 84     Resp: 16 18  16   Temp: 97.6 °F (36.4 °C) 97.7 °F (36.5 °C)  98 °F (36.7 °C)   SpO2: 92% 92% 92%    Weight:       Height:            Intake and Output:  Current Shift: No intake/output data recorded. Last three shifts: 11/17 1901 - 11/19 0700  In: 240 [P.O.:240]  Out: -     Physical Exam:   General:  Alert, cooperative, no distress, appears stated age. Eyes:  Conjunctivae/corneas clear. PERRL, EOMs intact. Fundi benign   Ears:  Normal TMs and external ear canals both ears. Nose: Nares normal. Septum midline. Mucosa normal. No drainage or sinus tenderness. Mouth/Throat: Lips, mucosa, and tongue normal. Teeth and gums normal.   Neck: Supple, symmetrical, trachea midline, no adenopathy, thyroid: no enlargment/tenderness/nodules, no carotid bruit and no JVD. Back:   Symmetric, no curvature. ROM normal. No CVA tenderness. Lungs:   Clear to auscultation bilaterally. Heart:  Regular rate and rhythm, S1, S2 normal, no murmur, click, rub or gallop. Abdomen:   Soft, non-tender. Bowel sounds normal. No masses,  No organomegaly. Extremities: Extremities normal, atraumatic, no cyanosis or edema. Pulses: 2+ and symmetric all extremities. Skin: Skin color, texture, turgor normal. No rashes or lesions   Lymph nodes: Cervical, supraclavicular, and axillary nodes normal.   Neurologic: CNII-XII intact. Normal strength, sensation and reflexes throughout. Lab/Data Review:   All lab results for the last 24 hours reviewed.      Recent Results (from the past 24 hour(s))   GLUCOSE, POC    Collection Time: 11/18/20 10:35 PM   Result Value Ref Range    Glucose (POC) 359 (H) 65 - 100 mg/dL    Performed by [address] [place] [address]  + INR    Collection Time: 11/19/20  6:14 AM   Result Value Ref Range    Prothrombin time 14.8 (H) 11.9 - 14.7 sec    INR 1.2 (H) 0.9 - 1.1     PTT    Collection Time: 11/19/20  6:14 AM   Result Value Ref Range    aPTT 31.0 23.0 - 35.7 sec    aPTT, therapeutic range   68 - 109 sec   FIBRINOGEN    Collection Time: 11/19/20  6:14 AM   Result Value Ref Range    Fibrinogen 338 220 - 535 mg/dL   D DIMER    Collection Time: 11/19/20  6:14 AM   Result Value Ref Range    D DIMER 2.45 (H) <0.50 ug/ml(FEU)   GLUCOSE, POC    Collection Time: 11/19/20  7:35 AM   Result Value Ref Range    Glucose (POC) 352 (H) 65 - 100 mg/dL    Performed by Ophelia Herrera, POC    Collection Time: 11/19/20 12:38 PM   Result Value Ref Range    Glucose (POC) 443 (H) 65 - 100 mg/dL    Performed by Alberto Babin          Assessment:     Active Problems:    Pneumonia due to COVID-19 virus (11/13/2020)      Sepsis ([address] (11/13/2020)      Paraesophageal mass for EUS    Plan:     Possible discharge on Friday after procedure  D/W Patient  Signed By: Bryanna Meraz MD     November 19, 2020

## 2020-11-20 ENCOUNTER — ANESTHESIA (OUTPATIENT)
Dept: ENDOSCOPY | Age: 45
DRG: 871 | End: 2020-11-20
Payer: COMMERCIAL

## 2020-11-20 ENCOUNTER — ANESTHESIA EVENT (OUTPATIENT)
Dept: ENDOSCOPY | Age: 45
DRG: 871 | End: 2020-11-20
Payer: COMMERCIAL

## 2020-11-20 ENCOUNTER — APPOINTMENT (OUTPATIENT)
Dept: ENDOSCOPY | Age: 45
DRG: 871 | End: 2020-11-20
Attending: INTERNAL MEDICINE
Payer: COMMERCIAL

## 2020-11-20 VITALS
HEART RATE: 66 BPM | WEIGHT: 242.51 LBS | HEIGHT: 70 IN | TEMPERATURE: 98 F | OXYGEN SATURATION: 93 % | BODY MASS INDEX: 34.72 KG/M2 | SYSTOLIC BLOOD PRESSURE: 93 MMHG | RESPIRATION RATE: 18 BRPM | DIASTOLIC BLOOD PRESSURE: 65 MMHG

## 2020-11-20 LAB
ANION GAP SERPL CALC-SCNC: 4 MMOL/L (ref 5–15)
APTT PPP: 27.7 SEC (ref 23–35.7)
BASOPHILS # BLD: 0 K/UL (ref 0–0.1)
BASOPHILS NFR BLD: 0 % (ref 0–1)
BUN SERPL-MCNC: 19 MG/DL (ref 6–20)
BUN/CREAT SERPL: 30 (ref 12–20)
CA-I BLD-MCNC: 8.7 MG/DL (ref 8.5–10.1)
CHLORIDE SERPL-SCNC: 98 MMOL/L (ref 97–108)
CO2 SERPL-SCNC: 33 MMOL/L (ref 21–32)
CREAT SERPL-MCNC: 0.64 MG/DL (ref 0.7–1.3)
D DIMER PPP FEU-MCNC: 2.24 UG/ML(FEU)
DIFFERENTIAL METHOD BLD: ABNORMAL
EOSINOPHIL # BLD: 0 K/UL (ref 0–0.4)
EOSINOPHIL NFR BLD: 0 % (ref 0–7)
ERYTHROCYTE [DISTWIDTH] IN BLOOD BY AUTOMATED COUNT: 14.1 % (ref 11.5–14.5)
FIBRINOGEN PPP-MCNC: 336 MG/DL (ref 220–535)
GLUCOSE BLD STRIP.AUTO-MCNC: 203 MG/DL (ref 65–100)
GLUCOSE BLD STRIP.AUTO-MCNC: 227 MG/DL (ref 65–100)
GLUCOSE BLD STRIP.AUTO-MCNC: 263 MG/DL (ref 65–100)
GLUCOSE SERPL-MCNC: 215 MG/DL (ref 65–100)
HCT VFR BLD AUTO: 49.8 % (ref 36.6–50.3)
HGB BLD-MCNC: 17.4 G/DL (ref 12.1–17)
IMM GRANULOCYTES # BLD AUTO: 0.5 K/UL (ref 0–0.04)
IMM GRANULOCYTES NFR BLD AUTO: 3 % (ref 0–0.5)
INR PPP: 1 (ref 0.9–1.1)
LYMPHOCYTES # BLD: 1.4 K/UL (ref 0.8–3.5)
LYMPHOCYTES NFR BLD: 10 % (ref 12–49)
MAGNESIUM SERPL-MCNC: 1.8 MG/DL (ref 1.6–2.4)
MCH RBC QN AUTO: 31 PG (ref 26–34)
MCHC RBC AUTO-ENTMCNC: 34.9 G/DL (ref 30–36.5)
MCV RBC AUTO: 88.6 FL (ref 80–99)
MONOCYTES # BLD: 0.6 K/UL (ref 0–1)
MONOCYTES NFR BLD: 4 % (ref 5–13)
NEUTS SEG # BLD: 12 K/UL (ref 1.8–8)
NEUTS SEG NFR BLD: 86 % (ref 32–75)
PERFORMED BY, TECHID: ABNORMAL
PLATELET # BLD AUTO: 387 K/UL (ref 150–400)
PMV BLD AUTO: 11.4 FL (ref 8.9–12.9)
POTASSIUM SERPL-SCNC: 4.2 MMOL/L (ref 3.5–5.1)
PROTHROMBIN TIME: 13.4 SEC (ref 11.9–14.7)
RBC # BLD AUTO: 5.62 M/UL (ref 4.1–5.7)
SODIUM SERPL-SCNC: 135 MMOL/L (ref 136–145)
THERAPEUTIC RANGE,PTTT: NORMAL SEC (ref 68–109)
WBC # BLD AUTO: 14 K/UL (ref 4.1–11.1)

## 2020-11-20 PROCEDURE — 80048 BASIC METABOLIC PNL TOTAL CA: CPT

## 2020-11-20 PROCEDURE — 0DJ08ZZ INSPECTION OF UPPER INTESTINAL TRACT, VIA NATURAL OR ARTIFICIAL OPENING ENDOSCOPIC: ICD-10-PCS | Performed by: INTERNAL MEDICINE

## 2020-11-20 PROCEDURE — 94760 N-INVAS EAR/PLS OXIMETRY 1: CPT

## 2020-11-20 PROCEDURE — 83735 ASSAY OF MAGNESIUM: CPT

## 2020-11-20 PROCEDURE — 85384 FIBRINOGEN ACTIVITY: CPT

## 2020-11-20 PROCEDURE — 74011250637 HC RX REV CODE- 250/637: Performed by: INTERNAL MEDICINE

## 2020-11-20 PROCEDURE — 76040000007: Performed by: INTERNAL MEDICINE

## 2020-11-20 PROCEDURE — 74011000250 HC RX REV CODE- 250: Performed by: NURSE ANESTHETIST, CERTIFIED REGISTERED

## 2020-11-20 PROCEDURE — 76060000032 HC ANESTHESIA 0.5 TO 1 HR: Performed by: INTERNAL MEDICINE

## 2020-11-20 PROCEDURE — 85379 FIBRIN DEGRADATION QUANT: CPT

## 2020-11-20 PROCEDURE — 74011250636 HC RX REV CODE- 250/636: Performed by: INTERNAL MEDICINE

## 2020-11-20 PROCEDURE — 74011250636 HC RX REV CODE- 250/636: Performed by: NURSE ANESTHETIST, CERTIFIED REGISTERED

## 2020-11-20 PROCEDURE — 36415 COLL VENOUS BLD VENIPUNCTURE: CPT

## 2020-11-20 PROCEDURE — 85730 THROMBOPLASTIN TIME PARTIAL: CPT

## 2020-11-20 PROCEDURE — 2709999900 HC NON-CHARGEABLE SUPPLY: Performed by: INTERNAL MEDICINE

## 2020-11-20 PROCEDURE — 85025 COMPLETE CBC W/AUTO DIFF WBC: CPT

## 2020-11-20 PROCEDURE — 82962 GLUCOSE BLOOD TEST: CPT

## 2020-11-20 PROCEDURE — 85610 PROTHROMBIN TIME: CPT

## 2020-11-20 RX ORDER — INSULIN GLARGINE 100 [IU]/ML
50 INJECTION, SOLUTION SUBCUTANEOUS
Qty: 1 VIAL | Refills: 0 | Status: SHIPPED | OUTPATIENT
Start: 2020-11-20 | End: 2020-11-20

## 2020-11-20 RX ORDER — MELATONIN
2000 DAILY
Qty: 30 TAB | Refills: 0 | Status: SHIPPED | OUTPATIENT
Start: 2020-11-21 | End: 2020-11-20

## 2020-11-20 RX ORDER — INSULIN GLARGINE 100 [IU]/ML
50 INJECTION, SOLUTION SUBCUTANEOUS
Qty: 1 VIAL | Refills: 0 | Status: SHIPPED | OUTPATIENT
Start: 2020-11-20

## 2020-11-20 RX ORDER — BISACODYL 5 MG
5 TABLET, DELAYED RELEASE (ENTERIC COATED) ORAL DAILY
Qty: 3 TAB | Refills: 0 | Status: SHIPPED | OUTPATIENT
Start: 2020-11-20

## 2020-11-20 RX ORDER — DEXAMETHASONE 4 MG/1
6 TABLET ORAL DAILY
Status: DISCONTINUED | OUTPATIENT
Start: 2020-11-20 | End: 2020-11-20 | Stop reason: HOSPADM

## 2020-11-20 RX ORDER — BENZONATATE 100 MG/1
100 CAPSULE ORAL
Qty: 21 CAP | Refills: 0 | Status: SHIPPED | OUTPATIENT
Start: 2020-11-20 | End: 2020-11-20

## 2020-11-20 RX ORDER — LIDOCAINE HYDROCHLORIDE 20 MG/ML
INJECTION, SOLUTION EPIDURAL; INFILTRATION; INTRACAUDAL; PERINEURAL AS NEEDED
Status: DISCONTINUED | OUTPATIENT
Start: 2020-11-20 | End: 2020-11-20 | Stop reason: HOSPADM

## 2020-11-20 RX ORDER — GLYCOPYRROLATE 0.2 MG/ML
INJECTION INTRAMUSCULAR; INTRAVENOUS AS NEEDED
Status: DISCONTINUED | OUTPATIENT
Start: 2020-11-20 | End: 2020-11-20 | Stop reason: HOSPADM

## 2020-11-20 RX ORDER — MELATONIN
2000 DAILY
Qty: 30 TAB | Refills: 0 | Status: SHIPPED | OUTPATIENT
Start: 2020-11-21

## 2020-11-20 RX ORDER — BENZONATATE 100 MG/1
100 CAPSULE ORAL
Qty: 21 CAP | Refills: 0 | Status: SHIPPED | OUTPATIENT
Start: 2020-11-20 | End: 2020-11-27

## 2020-11-20 RX ORDER — PROPOFOL 10 MG/ML
INJECTION, EMULSION INTRAVENOUS AS NEEDED
Status: DISCONTINUED | OUTPATIENT
Start: 2020-11-20 | End: 2020-11-20 | Stop reason: HOSPADM

## 2020-11-20 RX ORDER — SODIUM CHLORIDE, SODIUM LACTATE, POTASSIUM CHLORIDE, CALCIUM CHLORIDE 600; 310; 30; 20 MG/100ML; MG/100ML; MG/100ML; MG/100ML
INJECTION, SOLUTION INTRAVENOUS
Status: DISCONTINUED | OUTPATIENT
Start: 2020-11-20 | End: 2020-11-20 | Stop reason: HOSPADM

## 2020-11-20 RX ORDER — BISACODYL 5 MG
5 TABLET, DELAYED RELEASE (ENTERIC COATED) ORAL DAILY
Qty: 3 TAB | Refills: 0 | Status: SHIPPED | OUTPATIENT
Start: 2020-11-20 | End: 2020-11-20

## 2020-11-20 RX ORDER — INSULIN LISPRO 100 [IU]/ML
10 INJECTION, SOLUTION INTRAVENOUS; SUBCUTANEOUS 3 TIMES DAILY
Qty: 1 VIAL | Refills: 0 | Status: SHIPPED
Start: 2020-11-20

## 2020-11-20 RX ORDER — CHOLECALCIFEROL (VITAMIN D3) 125 MCG
5 CAPSULE ORAL
Qty: 30 TAB | Refills: 0 | Status: SHIPPED | OUTPATIENT
Start: 2020-11-20

## 2020-11-20 RX ORDER — INSULIN LISPRO 100 [IU]/ML
10 INJECTION, SOLUTION INTRAVENOUS; SUBCUTANEOUS 3 TIMES DAILY
Qty: 1 VIAL | Refills: 0 | Status: SHIPPED
Start: 2020-11-20 | End: 2020-11-20

## 2020-11-20 RX ORDER — CHOLECALCIFEROL (VITAMIN D3) 125 MCG
5 CAPSULE ORAL
Qty: 30 TAB | Refills: 0 | Status: SHIPPED | OUTPATIENT
Start: 2020-11-20 | End: 2020-11-20

## 2020-11-20 RX ADMIN — PROPOFOL 100 MG: 10 INJECTION, EMULSION INTRAVENOUS at 13:17

## 2020-11-20 RX ADMIN — Medication 10 ML: at 06:15

## 2020-11-20 RX ADMIN — Medication 2 TABLET: at 09:12

## 2020-11-20 RX ADMIN — ENOXAPARIN SODIUM 30 MG: 100 INJECTION SUBCUTANEOUS at 09:12

## 2020-11-20 RX ADMIN — DEXAMETHASONE 6 MG: 4 TABLET ORAL at 09:12

## 2020-11-20 RX ADMIN — PROPOFOL 50 MG: 10 INJECTION, EMULSION INTRAVENOUS at 13:20

## 2020-11-20 RX ADMIN — Medication 10 ML: at 15:13

## 2020-11-20 RX ADMIN — SODIUM CHLORIDE, POTASSIUM CHLORIDE, SODIUM LACTATE AND CALCIUM CHLORIDE: 600; 310; 30; 20 INJECTION, SOLUTION INTRAVENOUS at 13:02

## 2020-11-20 RX ADMIN — LIDOCAINE HYDROCHLORIDE 40 MG: 20 INJECTION, SOLUTION EPIDURAL; INFILTRATION; INTRACAUDAL; PERINEURAL at 13:17

## 2020-11-20 RX ADMIN — GLYCOPYRROLATE 0.4 MG: 0.2 INJECTION, SOLUTION INTRAMUSCULAR; INTRAVENOUS at 13:17

## 2020-11-20 NOTE — DISCHARGE SUMMARY
Admit date: 11/12/2020   Admitting Provider: Ángel Forde MD    Discharge date: 11/20/2020  Discharging Provider: Ángel Forde MD      * Admission Diagnoses: Pneumonia due to COVID-19 virus [U07.1, J12.89]  Pneumonia due to COVID-19 virus [U07.1, J12.89]  Sepsis ([facility] [address] [A41.9]    * Discharge Diagnoses:    Hospital Problems as of 11/20/2020 Never Reviewed          Codes Class Noted - Resolved POA    Pneumonia due to COVID-19 virus ICD-10-CM: U07.1, J12.89  ICD-9-CM: 480.8  11/13/2020 - Present Unknown        Sepsis ([facility] [address] ICD-10-CM: A41.9  ICD-9-CM: 038.9, 995.91  11/13/2020 - Present Unknown              * Hospital Course: 39years old patient coming in with a complaint of shortness of breath was admitted for pneumonia which became positive for COVID-19 on outpatient basis patient was seen by pulmonology and she was started on steroids and antibiotics. Patient was also seen by infectious disease. Repeat COVID-19 test was negative patient was saturating well on room air and was cleared for discharge by pulmonary. He was seen for paraesophageal mass and IV gastroenterologist and scheduled for procedure the findings are not yet up in the computer.   Patient wants to go home advised to follow-up with the pulmonary doctor and also with the gastroenterologist    * Procedures:   Procedure(s):  ENDOSCOPIC ULTRASOUND (EUS)      Consults: Pulmonary/Intensive care    Significant Diagnostic Studies: labs:   Recent Results (from the past 24 hour(s))   GLUCOSE, POC    Collection Time: 11/19/20  3:53 PM   Result Value Ref Range    Glucose (POC) 274 (H) 65 - 100 mg/dL    Performed by Zachary Vasquez, POC    Collection Time: 11/19/20  9:39 PM   Result Value Ref Range    Glucose (POC) 89 65 - 100 mg/dL    Performed by [address] [place] [address], POC    Collection Time: 11/20/20  7:51 AM   Result Value Ref Range    Glucose (POC) 203 (H) 65 - 100 mg/dL    Performed by [address] [place] [address] + INR    Collection Time: 11/20/20  8:30 AM   Result Value Ref Range    Prothrombin time 13.4 11.9 - 14.7 sec    INR 1.0 0.9 - 1.1     PTT    Collection Time: 11/20/20  8:30 AM   Result Value Ref Range    aPTT 27.7 23.0 - 35.7 sec    aPTT, therapeutic range   68 - 109 sec   FIBRINOGEN    Collection Time: 11/20/20  8:30 AM   Result Value Ref Range    Fibrinogen 336 220 - 535 mg/dL   D DIMER    Collection Time: 11/20/20  8:30 AM   Result Value Ref Range    D DIMER 2.24 (H) <0.50 ug/ml(FEU)   METABOLIC PANEL, BASIC    Collection Time: 11/20/20  8:30 AM   Result Value Ref Range    Sodium 135 (L) 136 - 145 mmol/L    Potassium 4.2 3.5 - 5.1 mmol/L    Chloride 98 97 - 108 mmol/L    CO2 33 (H) 21 - 32 mmol/L    Anion gap 4 (L) 5 - 15 mmol/L    Glucose 215 (H) 65 - 100 mg/dL    BUN 19 6 - 20 mg/dL    Creatinine 0.64 (L) 0.70 - 1.30 mg/dL    BUN/Creatinine ratio 30 (H) 12 - 20      GFR est AA >60 >60 ml/min/1.73m2    GFR est non-AA >60 >60 ml/min/1.73m2    Calcium 8.7 8.5 - 10.1 mg/dL   MAGNESIUM    Collection Time: 11/20/20  8:30 AM   Result Value Ref Range    Magnesium 1.8 1.6 - 2.4 mg/dL   GLUCOSE, POC    Collection Time: 11/20/20 12:34 PM   Result Value Ref Range    Glucose (POC) 227 (H) 65 - 100 mg/dL    Performed by Terry Camargo          Discharge Exam:  Visit Vitals  /72   Pulse 77   Temp 97.9 °F (36.6 °C)   Resp 22   Ht 5' 10\" (1.778 m)   Wt 110 kg (242 lb 8.1 oz)   SpO2 92%   BMI 34.80 kg/m²     General:  Alert, cooperative, no distress, appears stated age. Head:  Normocephalic, without obvious abnormality, atraumatic. Eyes:  Conjunctivae/corneas clear. PERRL, EOMs intact. Fundi benign   Ears:  Normal TMs and external ear canals both ears. Nose: Nares normal. Septum midline. Mucosa normal. No drainage or sinus tenderness. Throat: Lips, mucosa, and tongue normal. Teeth and gums normal.   Neck: Supple, symmetrical, trachea midline, no adenopathy, thyroid: no enlargement/tenderness/nodules, no carotid bruit and no JVD. Back:   Symmetric, no curvature. ROM normal. No CVA tenderness. Lungs:   Clear to auscultation bilaterally. Chest wall:  No tenderness or deformity. Heart:  Regular rate and rhythm, S1, S2 normal, no murmur, click, rub or gallop. Abdomen:   Soft, non-tender. Bowel sounds normal. No masses,  No organomegaly. Genitalia:  Normal male without lesion, discharge or tenderness. Rectal:  Normal tone, normal prostate, no masses or tenderness  Guaiac negative stool. Extremities: Extremities normal, atraumatic, no cyanosis or edema. Pulses: 2+ and symmetric all extremities. Skin: Skin color, texture, turgor normal. No rashes or lesions   Lymph nodes: Cervical, supraclavicular, and axillary nodes normal.   Neurologic: CNII-XII intact. Normal strength, sensation and reflexes throughout. * Discharge Condition: improved  * Disposition: Home    Discharge Medications:  Current Discharge Medication List      START taking these medications    Details   benzonatate (TESSALON) 100 mg capsule Take 1 Cap by mouth three (3) times daily as needed for Cough for up to 7 days. Qty: 21 Cap, Refills: 0      bisacodyL (DULCOLAX) 5 mg EC tablet Take 1 Tab by mouth daily. Qty: 3 Tab, Refills: 0      cholecalciferol (VITAMIN D3) (1000 Units /25 mcg) tablet Take 2 Tabs by mouth daily. Indications: low vitamin D levels  Qty: 30 Tab, Refills: 0      insulin glargine (LANTUS) 100 unit/mL injection 50 Units by SubCUTAneous route nightly. Qty: 1 Vial, Refills: 0      insulin lispro (HUMALOG) 100 unit/mL injection 10 Units by SubCUTAneous route three (3) times daily. Qty: 1 Vial, Refills: 0      melatonin 5 mg tablet Take 1 Tab by mouth nightly. Qty: 30 Tab, Refills: 0         CONTINUE these medications which have NOT CHANGED    Details   albuterol (PROVENTIL HFA, VENTOLIN HFA, PROAIR HFA) 90 mcg/actuation inhaler Take 2 Puffs by inhalation four (4) times daily as needed.       aspirin-calcium carbonate 81 mg-300 mg calcium(777 mg) tab Take 81 mg by mouth daily. STOP taking these medications       rosuvastatin (CRESTOR) 10 mg tablet Comments:   Reason for Stopping:         promethazine-dextromethorphan (PROMETHAZINE-DM) 6.25-15 mg/5 mL syrup Comments:   Reason for Stopping:               * Follow-up Care/Patient Instructions:   Activity: Activity as tolerated  Diet: Cardiac Diet  Wound Care: None needed    Follow-up Information     Follow up With Specialties Details Why Contact Info    Other, MD Cosme    Patient can only remember the practice name and not the physician      Hira Olmos MD Gastroenterology In 1 week  [address]       Vicky Galeas MD Pulmonary Disease, Internal Medicine In 3 days  [address] [facility]  [phone]      Carlos Solomon MD Internal Medicine In 1 week  [address] [facility]  [phone]          40 minutes discharge time  Signed:  Bryanna Meraz MD  11/20/2020  2:23 PM

## 2020-11-20 NOTE — ANESTHESIA PREPROCEDURE EVALUATION
Relevant Problems   No relevant active problems       Anesthetic History   No history of anesthetic complications            Review of Systems / Medical History  Patient summary reviewed, nursing notes reviewed and pertinent labs reviewed    Pulmonary          Pneumonia      Comments: FORMER SMOKER   Neuro/Psych             Comments: LEFT NUMBNESS Cardiovascular                    Comments: Sinus tachycardia. GI/Hepatic/Renal                Endo/Other    Diabetes         Other Findings   Comments: .  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 .  2.  Patchy bilateral airspace disease in both lungs consistent with reported  history of atypical viral infection/COVID pneumonia. 3.  Diffusely thickened esophageal wall of uncertain etiology and significance. 4.  Coronary atherosclerosis. COVID19 NEGATIVE. Physical Exam    Airway  Mallampati: I           Cardiovascular    Rhythm: regular  Rate: normal         Dental    Dentition: Poor dentition  Comments: WHITE PATCHES IN POSTERIOR PHARYNX.     Pulmonary      Decreased breath sounds           Abdominal         Other Findings            Anesthetic Plan    ASA: 3  Anesthesia type: total IV anesthesia and general - backup          Induction: Intravenous  Anesthetic plan and risks discussed with: Patient

## 2020-11-20 NOTE — PROGRESS NOTES
Progress Note    Patient: Marisol Contreras MRN: [id number]  SSN: [id number]    YOB: 1975  Age: 39 y.o. Sex: male      Admit Date: 11/12/2020    LOS: 7 days     Subjective:     Patient just got back to his room from endoscopy, s/p EUS of paraesophageal mass. He is awake, no acute distress, denies pain. The plan is to discharge him today. Objective:     Vitals:    11/20/20 1332 11/20/20 1339 11/20/20 1345 11/20/20 1350   BP: 103/83 102/71 104/72 105/72   Pulse: 80 75 80 77   Resp: 15 19 18 22   Temp:       SpO2: 97% 91% 90% 92%   Weight:       Height:            Intake and Output:  Current Shift: 11/20 0701 - 11/20 1900  In: 100 [I.V.:100]  Out: -   Last three shifts: 11/18 1901 - 11/20 0700  In: 800 [P.O.:800]  Out: 200     Physical Exam:   Skin:  Extremities and face reveal no rashes. No valenzuela erythema. HEENT: Sclerae anicteric. Extra-occular muscles are intact. No abnormal pigmentation of the lips. The neck is supple. Cardiovascular: Regular rate and rhythm. Respiratory:  Comfortable breathing with no accessory muscle use. GI:  Abdomen nondistended, soft, and nontender. No enlargement of the liver or spleen. No masses palpable. Rectal:  Deferred  Musculoskeletal: Extremities have good range of motion. Neurological:  Gross memory appears intact. Patient is alert and oriented. Psychiatric:  Mood appears appropriate with judgement intact.   Lymphatic:  No visible adenopathy      Lab/Data Review:  Recent Results (from the past 24 hour(s))   GLUCOSE, POC    Collection Time: 11/19/20  3:53 PM   Result Value Ref Range    Glucose (POC) 274 (H) 65 - 100 mg/dL    Performed by Luc Maravilla    GLUCOSE, POC    Collection Time: 11/19/20  9:39 PM   Result Value Ref Range    Glucose (POC) 89 65 - 100 mg/dL    Performed by [id number], POC    Collection Time: 11/20/20  7:51 AM   Result Value Ref Range    Glucose (POC) 203 (H) 65 - 100 mg/dL    Performed by Juan Pablo Mulligan PROTHROMBIN TIME + INR    Collection Time: 11/20/20  8:30 AM   Result Value Ref Range    Prothrombin time 13.4 11.9 - 14.7 sec    INR 1.0 0.9 - 1.1     PTT    Collection Time: 11/20/20  8:30 AM   Result Value Ref Range    aPTT 27.7 23.0 - 35.7 sec    aPTT, therapeutic range   68 - 109 sec   FIBRINOGEN    Collection Time: 11/20/20  8:30 AM   Result Value Ref Range    Fibrinogen 336 220 - 535 mg/dL   D DIMER    Collection Time: 11/20/20  8:30 AM   Result Value Ref Range    D DIMER 2.24 (H) <0.50 ug/ml(FEU)   METABOLIC PANEL, BASIC    Collection Time: 11/20/20  8:30 AM   Result Value Ref Range    Sodium 135 (L) 136 - 145 mmol/L    Potassium 4.2 3.5 - 5.1 mmol/L    Chloride 98 97 - 108 mmol/L    CO2 33 (H) 21 - 32 mmol/L    Anion gap 4 (L) 5 - 15 mmol/L    Glucose 215 (H) 65 - 100 mg/dL    BUN 19 6 - 20 mg/dL    Creatinine 0.64 (L) 0.70 - 1.30 mg/dL    BUN/Creatinine ratio 30 (H) 12 - 20      GFR est AA >60 >60 ml/min/1.73m2    GFR est non-AA >60 >60 ml/min/1.73m2    Calcium 8.7 8.5 - 10.1 mg/dL   MAGNESIUM    Collection Time: 11/20/20  8:30 AM   Result Value Ref Range    Magnesium 1.8 1.6 - 2.4 mg/dL   GLUCOSE, POC    Collection Time: 11/20/20 12:34 PM   Result Value Ref Range    Glucose (POC) 227 (H) 65 - 100 mg/dL    Performed by Paulina DAY               CT ABD W CONT   Final Result   IMPRESSION:   1. Homogeneous, low-density right paraesophageal mass as seen on prior CT. 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 This could be   further evaluated with endoscopic ultrasound. 2. Persistent perihilar and infrahilar airspace opacities with additional   peripheral groundglass opacities. While these imaging features can be seen with   Covid-19 pneumonia, they are nonspecific and can occur with a variety of   infectious and noninfectious processes. 3. Hepatic steatosis. 4. Nonspecific, mildly enlarged right paraesophageal lymph node.       CT CHEST W CONT   Final Result   IMPRESSION: 1. Homogeneous, low-density right paraesophageal mass as seen on prior CT. 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 This could be   further evaluated with endoscopic ultrasound. 2. Persistent perihilar and infrahilar airspace opacities with additional   peripheral groundglass opacities. While these imaging features can be seen with   Covid-19 pneumonia, they are nonspecific and can occur with a variety of   infectious and noninfectious processes. 3. Hepatic steatosis. 4. Nonspecific, mildly enlarged right paraesophageal lymph node. XR CHEST SNGL V   Final Result   Addendum 1 of 1   Addendum: Addendum/   impression:      Infrahilar disease on the right corresponds to mass seen on CT exam    reported   separately. Please see that report for further assessment pulmonary    findings. Final      CT CHEST WO CONT   Final Result   Impression:      1. 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 Follow-up   contrast enhanced CT of the chest recommended. 2.  Patchy bilateral airspace disease in both lungs consistent with reported   history of atypical viral infection/COVID pneumonia. 3.  Diffusely thickened esophageal wall of uncertain etiology and significance. 4.  Coronary atherosclerosis. 5.  Additional findings as above. Assessment:     Active Problems:    Pneumonia due to COVID-19 virus (11/13/2020)      Sepsis ([address] (11/13/2020)        Plan:   EUS done. Esophageal duplication cyst. He can be discharge from our standpoint. Follow up as outpatient 2 weeks after discharge. Repeat Endosocpic ultrasound in 6 months. Patient discussed with Dr Destiny Hollins and agrees to above impression and plan  Thank you for allowing me to participate in this patients care    Signed By: Arcelia Malin.  SHAQ Elizondo     November 20, 2020

## 2020-11-20 NOTE — ANESTHESIA POSTPROCEDURE EVALUATION
Procedure(s):  ENDOSCOPIC ULTRASOUND (EUS).     general - backup, MAC    Anesthesia Post Evaluation      Multimodal analgesia: multimodal analgesia not used between 6 hours prior to anesthesia start to PACU discharge  Patient location during evaluation: bedside  Patient participation: complete - patient participated  Level of consciousness: awake and awake and alert  Pain score: 0  Pain management: adequate  Airway patency: patent  Anesthetic complications: no  Cardiovascular status: acceptable  Respiratory status: acceptable  Hydration status: acceptable  Post anesthesia nausea and vomiting:  none  Final Post Anesthesia Temperature Assessment:  Normothermia (36.0-37.5 degrees C)      INITIAL Post-op Vital signs:   Vitals Value Taken Time   /83 11/20/2020  1:32 PM   Temp     Pulse 80 11/20/2020  1:32 PM   Resp 15 11/20/2020  1:32 PM   SpO2 97 % 11/20/2020  1:32 PM

## 2020-11-20 NOTE — PROGRESS NOTES
Pulmonology and Critical Care Progress Note    Subjective:     Chief Complaint:   Chief Complaint   Patient presents with    Chest Pain      Patient seen and examined in his room this morning. No acute events overnight. I discussed the case in detail with the patient at the bedside. His repeat COVID-19 test is negative. Lying in bed comfortably; awake and alert  On room air and saturating well. Patient admits that his respiratory status is well controlled. No acute distress  GI following and are planning an EUS today; he is asking to be discharged today after the procedure. Review of Systems:  A comprehensive review of systems was negative except for that written in the HPI.     Current Facility-Administered Medications   Medication Dose Route Frequency Provider Last Rate Last Dose    dexAMETHasone (DECADRON) tablet 6 mg  6 mg Oral Q12H Florencio Adhikari DO   6 mg at 11/19/20 2322    insulin lispro (HUMALOG) injection 15 Units  15 Units SubCUTAneous TID WITH MEALS Zach ERAZO MD   15 Units at 11/19/20 1651    insulin glargine (LANTUS) injection 50 Units  50 Units SubCUTAneous DAILY Zach ERAZO MD        glucose chewable tablet 16 g  4 Tab Oral PRN Zach ERAZO MD        dextrose (D50W) injection syrg 12.5-25 g  25-50 mL IntraVENous PRN Zach ERAZO MD        glucagon (GLUCAGEN) injection 1 mg  1 mg IntraMUSCular PRN Zach ERAZO MD        melatonin tablet 5 mg  5 mg Oral QHS Zach ERAZO MD   5 mg at 11/19/20 2322    enoxaparin (LOVENOX) injection 30 mg  30 mg SubCUTAneous Q12H Zach ERAZO MD   30 mg at 11/19/20 2322    acetaminophen (TYLENOL) tablet 650 mg  650 mg Oral Q6H PRN Zach ERAZO MD   650 mg at 11/13/20 2343    Or    acetaminophen (TYLENOL) suppository 650 mg  650 mg Rectal Q6H PRN Zach ERAZO MD        cholecalciferol (VITAMIN D3) (1000 Units /25 mcg) tablet 2 Tab  2,000 Units Oral DAILY Ulysses Desouza MD   2 Tab at 11/19/20 0037    guaiFENesin-dextromethorphan (ROBITUSSIN DM) 100-10 mg/5 mL syrup 5 mL  5 mL Oral Q4H PRN Quang ERAZO MD        sodium chloride (NS) flush 5-40 mL  5-40 mL IntraVENous Q8H Reynold Thornton MD   10 mL at 20 0615    sodium chloride (NS) flush 5-40 mL  5-40 mL IntraVENous PRN Quang ERAZO MD        bisacodyL (DULCOLAX) tablet 5 mg  5 mg Oral DAILY PRN Teresa Delcid MD        benzonatate (TESSALON) capsule 100 mg  100 mg Oral TID PRN Sophy Thomas MD   100 mg at 20 2343            No Known Allergies        Objective:     Blood pressure 93/60, pulse 73, temperature 97.7 °F (36.5 °C), resp. rate 18, height 5' 10\" (1.778 m), weight 110 kg (242 lb 8.1 oz), SpO2 93 %. Temp (24hrs), Av.8 °F (36.6 °C), Min:97.7 °F (36.5 °C), Max:98 °F (36.7 °C)      Intake and Output:  Current Shift: No intake/output data recorded. Last 3 Shifts:  1901 -  0700  In: 800 [P.O.:800]  Out: 200     Physical Exam:    General: Lying in bed comfortably, no acute distress  Eye: Reactive, symmetric  Throat and Neck: Supple  Lung: Reduced air entry bilaterally with prolonged exhalation but no wheezing. Occasional crackles. On room air. Heart: S1+S2. No murmurs  Abdomen: soft, non-tender. Bowel sounds normal. No masses; obese  Extremities: No edema  : Not done  Skin: No cyanosis  Neurologic: A & O x3.   Grossly nonfocal  Psychiatric: Appropriate affect; coherent    Lab/Data Review:  Recent Results (from the past 24 hour(s))   GLUCOSE, POC    Collection Time: 20 12:38 PM   Result Value Ref Range    Glucose (POC) 443 (H) 65 - 100 mg/dL    Performed by Zena Daferik, POC    Collection Time: 20  3:53 PM   Result Value Ref Range    Glucose (POC) 274 (H) 65 - 100 mg/dL    Performed by Alicia Craig    GLUCOSE, POC    Collection Time: 20  9:39 PM   Result Value Ref Range    Glucose (POC) 89 65 - 100 mg/dL    Performed by [address] [place] [address], POC    Collection Time: 11/20/20  7:51 AM   Result Value Ref Range    Glucose (POC) 203 (H) 65 - 100 mg/dL    Performed by [facility] [place] CHUY      chest X-ray      CT ABD W CONT   Final Result   IMPRESSION:   1. Homogeneous, low-density right paraesophageal mass as seen on prior CT. 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 This could be   further evaluated with endoscopic ultrasound. 2. Persistent perihilar and infrahilar airspace opacities with additional   peripheral groundglass opacities. While these imaging features can be seen with   Covid-19 pneumonia, they are nonspecific and can occur with a variety of   infectious and noninfectious processes. 3. Hepatic steatosis. 4. Nonspecific, mildly enlarged right paraesophageal lymph node. CT CHEST W CONT   Final Result   IMPRESSION:   1. Homogeneous, low-density right paraesophageal mass as seen on prior CT. 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 This could be   further evaluated with endoscopic ultrasound. 2. Persistent perihilar and infrahilar airspace opacities with additional   peripheral groundglass opacities. While these imaging features can be seen with   Covid-19 pneumonia, they are nonspecific and can occur with a variety of   infectious and noninfectious processes. 3. Hepatic steatosis. 4. Nonspecific, mildly enlarged right paraesophageal lymph node. XR CHEST SNGL V   Final Result   Addendum 1 of 1   Addendum: Addendum/   impression:      Infrahilar disease on the right corresponds to mass seen on CT exam    reported   separately. Please see that report for further assessment pulmonary    findings. Final      CT CHEST WO CONT   Final Result   Impression:      1. 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 Follow-up   contrast enhanced CT of the chest recommended.    2.  Patchy bilateral airspace disease in both lungs consistent with reported   history of atypical viral infection/COVID pneumonia. 3.  Diffusely thickened esophageal wall of uncertain etiology and significance. 4.  Coronary atherosclerosis. 5.  Additional findings as above. CT Results  (Last 48 hours)    None          Assessment:     1. Sepsis  2. COVID-19 infection  3. Paraesophageal mass  4. LISA    Plan:     1.)  Sepsis:  Patient has developed sepsis from COVID-19 infection. On azithromycin, Zosyn stopped by ID. S/p 7 days of Azithro. Lactate and white blood cell count normalized. Sputum culture with normal edita. 2.)  COVID-19 infection:  He has been confirmed to have COVID-19 infection, repeat test is now negative. Repeat CT scan of the chest showing persistent perihilar and infrahilar airspace opacities with additional peripheral groundglass opacities. However, the patient is doing very well on room air currently. Received Actemra. Status post remdesivir  Decrease Decadron to 6 mg PO daily today; this can be stopped at discharge. Likely will have a significant positive effect on his hyperglycemia. S/p 7 days of azithromycin    3.)  Paraesophageal mass:  CT abdomen/pelvis completed on 11/15/2020 showing a paraesophageal mass favored to represent a duplication cyst.  Appreciate GI evaluation; patient is now doing very well on room air without much in terms of respiratory symptoms. He is also now COVID-19 negative. From a pulmonary perspective, I see no issue with undergoing endoscopic evaluation. GI is planning EUS this morning. Okay for discharge home from a Pulmonary perspective. 4.)  LISA:  Improved after his IV hydration.           Questions of patient were answered at bedside in detail  Case discussed in detail with RN, RT, and care team    Thank you for involving me in the care of this patient  I will follow with you closely during hospitalization    Time spent more than 30 minutes in direct patient care with no overlap reviewing results and records, decision-making, and answering questions. Becky Mckinney DO  [facility]  11/20/2020  3:01 PM    This documentation was prepared using voice recognition software. Typographical errors may occur.   Attempt has been made to fix those errors however inadvertent errors may persist

## 2020-11-20 NOTE — PROGRESS NOTES
CM spoke to patient at bedside. Patient reported that he is hoping to be discharged today after procedure. Patient reported he has no PCP at the moment and no HH.     Emergency contact is:  Arthur Schirmer (mother)  [phone]    Jarred Ingram (girlfriend) [phone]

## 2020-11-20 NOTE — PROCEDURES
[facility]  PROCEDURE NOTE    Name:  Aleksandra Baca  MR#:  [id number]  :  1975  ACCOUNT #:  [de-identified]  DATE OF SERVICE:  2020    PREOPERATIVE DIAGNOSIS:  Cyst in mediastinum    POSTOPERATIVE DIAGNOSIS:  Simple cyst alongside the esophagus. Cyst measures 6.2 x 4.8 x 9 cm in dimension. It is a clear cyst without any debris or associated mass. The walls of the cyst are thin. No lymph nodes were identified close to the cyst area. PROCEDURE PERFORMED:  Upper echoendoscopic ultrasound. INDICATION:  Abnormal CAT scan of the chest.    SURGEON:  Agusto Arteaga MD    ASSISTANT:  Nursing staff    ANESTHESIA:  Deep sedation with propofol. ESTIMATED BLOOD LOSS: none    SPECIMENS REMOVED: none    COMPLICATIONS: none    IMPLANTS:  none    DESCRIPTION OF PROCEDURE:  The patient was placed in left lateral position. Bite block was placed between the teeth. Olympus echoendoscope was advanced from the mouth down to the stomach. From 30 cm to 40 cm, a cystic lesion was identified alongside the esophagus. It was a clear cyst without any debris or mass or lymph nodes. RECOMMENDATIONS:  Monitor for any clinical symptoms and follow up as an outpatient in two weeks' time. Thank you for allowing me to participate in this patient's care.       MD HAYDEN [email or web address]  D:  2020 13:30  T:  2020 16:49  JOB #:  [id number]

## 2020-11-21 NOTE — PROGRESS NOTES
Discharge plan of care/case management plan validated with provider discharge order. Discharged home on self care. Discharge instructions given and patient verbalized understanding. Noted wrong pharmacy used for the discharge prescription,contacted Dr Asher Chowdary who said he would call the [facility] for a new prescription,given the number to call. .Patient wheeled to the exit per wheelchair in satisfactory condition.

## 2022-03-18 PROBLEM — J12.82 PNEUMONIA DUE TO COVID-19 VIRUS: Status: ACTIVE | Noted: 2020-11-13

## 2022-03-18 PROBLEM — A41.9 SEPSIS (HCC): Status: ACTIVE | Noted: 2020-11-13

## 2022-03-18 PROBLEM — U07.1 PNEUMONIA DUE TO COVID-19 VIRUS: Status: ACTIVE | Noted: 2020-11-13

## 2023-03-17 NOTE — H&P
SUBJECTIVE:                                                   Paoal Couch is a 37 year old female who presents to clinic today for the following health issue(s):      Additional information: endometriosis      HPI:    Hx surgically confirmed endometriosis. Former patient of Dr. Bowles, had 2x laparoscopic surgeries with him. Also had been seeing him for infertility.    Uterine adhesions removed during first lap in . Right fallopian tube was blocked on HSG which was unblocked during this surgery. Started on Junel afterwards. Had adhesions removed in pelvic bowl and along bowel for second lap in . Initial pain from endometriosis improved after her surgeries.    Since her surgeries, she has still been unable to get pregnant. In the last few weeks, she has been having more endometriosis symptoms despite taking Junel. Was prescribed naltrexone for pain. Pelvic pain is cyclical and has been worse since running out of naltrexone. Last pelvic U/S was in September.    Goes to fertility clinic in [place] where she gets IVF. They recommended to get Lupron depot injection here.    Pap 1 month ago normal, had LEEP in  for LUZ MARIA 1-2.      No LMP recorded. (Menstrual status: Birth Control)..     Patient is sexually active, .  Using OCP for contraception.    reports that she has quit smoking. She has never used smokeless tobacco.  STD testing offered?  Declined    Health maintenance updated:  no    Today's PHQ-2 Score:   PHQ-2 (  [employer]) 3/21/2023   Q1: Little interest or pleasure in doing things 0   Q2: Feeling down, depressed or hopeless 0   PHQ-2 Score 0   PHQ-2 Total Score (12-17 Years)- Positive if 3 or more points; Administer PHQ-A if positive -     Today's PHQ-9 Score:   PHQ-9 SCORE 8/15/2019   PHQ-9 Total Score 15     Today's RAUDEL-7 Score:   RAUDEL-7 SCORE 2019   Total Score 9       Problem list and histories reviewed & adjusted, as indicated.  Additional history: as documented.    Patient  History and Physical    Patient: Nabor Olvera MRN: [id number]  SSN: [id number]    YOB: 1975  Age: 39 y.o. Sex: male      Subjective:      Nabor Olvera is a 39 y.o. male who was diagnosed with COVID-19 bilateral pneumonia. Patient complains of headache diarrhea body aches dry cough and fever came to the emergency room for evaluation because of worsening symptoms. White blood cell count is elevated at 20,000 CT of the chest shows    Esophageal mass. Patient was saturating at 94% on room air. 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 History reviewed. No pertinent family history. Social History     Tobacco Use    Smoking status: Never Smoker    Smokeless tobacco: Never Used   Substance Use Topics    Alcohol use: Never     Frequency: Never      Prior to Admission medications    Medication Sig Start Date End Date Taking? Authorizing Provider   rosuvastatin (CRESTOR) 10 mg tablet Take 1 Tab by mouth daily. 1/15/19  Yes Provider, Historical   albuterol (PROVENTIL HFA, VENTOLIN HFA, PROAIR HFA) 90 mcg/actuation inhaler Take 2 Puffs by inhalation four (4) times daily as needed. 11/11/20   Provider, Historical   aspirin-calcium carbonate 81 mg-300 mg calcium(777 mg) tab Take 81 mg by mouth daily. Provider, Historical   promethazine-dextromethorphan (PROMETHAZINE-DM) 6.25-15 mg/5 mL syrup Take 5 mL by mouth three (3) times daily. 11/11/20   Provider, Historical        No Known Allergies    Review of Systems:  A comprehensive review of systems was negative except for that written in the History of Present Illness.     Objective:     Vitals:    11/13/20 0257 11/13/20 0436 11/13/20 0612 11/13/20 1008   BP: 111/78 111/64 102/75 129/74   Pulse: (!) 119 (!) 114 (!) 117 (!) 112   Resp: 28 27 28 26   Temp:  99 °F (37.2 °C)     SpO2: 95% 98% 95% 97%   Weight: Active Problem List   Diagnosis     Morbid obesity (H)     Controlled type 2 diabetes mellitus without complication, without long-term current use of insulin (H)     Irritable bowel syndrome with diarrhea     Generalized anxiety disorder     Attention deficit hyperactivity disorder (ADHD)     Endometriosis     Past Surgical History:   Procedure Laterality Date     GYN SURGERY  2018    laparoscopy for endometriosis     HEAD & NECK SURGERY  age 14    tooth extraction     LAPAROSCOPIC MYOMECTOMY UTERUS N/A 11/9/2021    Procedure: Laparoscopy lysis of adhesions;  Surgeon: Boyd Bowles MD;  Location: [facility] OR     LASER CO2 LAPAROSCOPIC VAPORIZATION ENDOMETRIUM N/A 4/30/2019    Procedure: LAPAROSCOPY WITH CO2 LASER;  Surgeon: Boyd Bowles MD;  Location: [facility] OR     LASER CO2 LAPAROSCOPY DIAGNOSTIC, LYSIS ADHESIONS, COMBINED N/A 11/9/2021    Procedure: with Co2 Laser;  Surgeon: Boyd Bowles MD;  Location:  OR      Social History     Tobacco Use     Smoking status: Former     Smokeless tobacco: Never   Substance Use Topics     Alcohol use: Yes     Comment: occasionally      Problem (# of Occurrences) Relation (Name,Age of Onset)    Diabetes (2) Maternal Grandmother, Maternal Grandfather            Current Outpatient Medications   Medication Sig     blood glucose ([employer] BLOOD GLUCOSE TEST) test strip      CALCIUM-MAGNESIUM-ZINC PO Take 1 tablet by mouth daily     cholecalciferol (VITAMIN D3) 5000 units TABS tablet Take 5,000 Units by mouth daily     Coenzyme Q10 (COQ10 PO) Take 100 mg by mouth daily      escitalopram (LEXAPRO) 5 MG tablet Take 10 mg by mouth every morning      magnesium 200 MG TABS Take 200 mg by mouth     metFORMIN (GLUCOPHAGE-XR) 750 MG 24 hr tablet Take 1,500 mg by mouth     norethindrone-ethinyl estradiol (JUNEL FE 1/20) 1-20 MG-MCG tablet TAKE 1 ACTIVE TABLET BY MOUTH DAILY FOR CONTINUOUS SUPPRESSION     Omega-3 Fatty Acids (FISH OIL PO) Take 900 mg by mouth daily      Prenatal Vit-Fe  Height:            Physical Exam:  General:  Alert, anxious and ill looking. Eyes:  Conjunctivae/corneas clear. PERRL, EOMs intact. Fundi benign   Ears:  Normal TMs and external ear canals both ears. Nose: Nares normal. Septum midline. Mucosa normal. No drainage or sinus tenderness. Mouth/Throat: Lips, mucosa, and tongue normal. Teeth and gums normal.   Neck: Supple, symmetrical, trachea midline, no adenopathy, thyroid: no enlargment/tenderness/nodules, no carotid bruit and no JVD. Back:   Symmetric, no curvature. ROM normal. No CVA tenderness. Lungs:    Rhonchi bilaterally   Heart:  Regular rate and rhythm, S1, S2 normal, no murmur, click, rub or gallop. Abdomen:   Soft, non-tender. Bowel sounds normal. No masses,  No organomegaly. Extremities: Extremities normal, atraumatic, no cyanosis or edema. Pulses: 2+ and symmetric all extremities. Skin: Skin color, texture, turgor normal. No rashes or lesions   Lymph nodes: Cervical, supraclavicular, and axillary nodes normal.   Neurologic: CNII-XII intact. Normal strength, sensation and reflexes throughout. Recent Results (from the past 24 hour(s))   EKG, 12 LEAD, INITIAL    Collection Time: 11/12/20  9:58 PM   Result Value Ref Range    Ventricular Rate 129 BPM    Atrial Rate 129 BPM    P-R Interval 138 ms    QRS Duration 82 ms    Q-T Interval 286 ms    QTC Calculation (Bezet) 418 ms    Calculated P Axis 46 degrees    Calculated R Axis 28 degrees    Calculated T Axis 47 degrees    Diagnosis       Sinus tachycardia  Nonspecific T wave abnormality  Abnormal ECG  When compared with ECG of 22-SEP-2018 12:38,  Vent.  rate has increased BY  53 BPM  Confirmed by Earna Peabody ([id number]) on 11/13/2020 6:56:37 AM     CBC WITH AUTOMATED DIFF    Collection Time: 11/12/20 10:00 PM   Result Value Ref Range    WBC 28.4 (H) 4.1 - 11.1 K/uL    RBC 5.22 4.10 - 5.70 M/uL    HGB 16.2 12.1 - 17.0 g/dL    HCT 46.5 36.6 - 50.3 %    MCV 89.1 80.0 - 99.0 FL    MCH 31.0 26.0 - "Fumarate-FA (PRENATAL VITAMIN PO) Take 1 tablet by mouth daily [place]     UNABLE TO FIND MEDICATION NAME: alpha lopic acid, one tablet daily     atomoxetine (STRATTERA) 40 MG capsule      blood glucose monitoring (FREESTYLE) lancets      FREESTYLE LITE test strip      Current Facility-Administered Medications   Medication     [START ON 4/4/2023] leuprolide (LUPRON DEPOT) kit 3.75 mg     No Known Allergies    ROS:  12 point review of systems negative other than symptoms noted below or in the HPI.  No urinary frequency or dysuria, bladder or kidney problems      OBJECTIVE:     /78   Ht 1.626 m (5' 4\")   Wt 107.3 kg (236 lb 9.6 oz)   BMI 40.61 kg/m    Body mass index is 40.61 kg/m .    Exam:  Constitutional:  Appearance: Well nourished, well developed alert, in no acute distress  Neurologic:  Mental Status: Grossly oriented. Normal strength and tone, mentation intact and speech normal.    Psychiatric:  Mentation appears normal and affect normal/bright.     In-Clinic Test Results:  No results found for this or any previous visit (from the past 24 hour(s)).    ASSESSMENT/PLAN:                                                        ICD-10-CM    1. Endometriosis  N80.9 US Pelvic Transabdominal and Transvaginal     leuprolide (LUPRON DEPOT) kit 3.75 mg      2. Pelvic pain in female  R10.2 US Pelvic Transabdominal and Transvaginal     leuprolide (LUPRON DEPOT) kit 3.75 mg          -Pelvic ultrasound to re-evaluate anatomy and ensure no significant concerns with uterus or ovaries  -Request medical records from out-of-state clinic for continuity of care  -Lupron injection at later date following imaging, would prefer monthly injections. Medication has been ordered to start prior authorization process  -Phone follow-up following ultrasound  -Reviewed prior operative reports in EMR    Hoang Slater, MS3  Medical Student, [facility]    Physician Attestation   I, Kasia Tan MD, was present with the " 34.0 PG    MCHC 34.8 30.0 - 36.5 g/dL    RDW 13.3 11.5 - 14.5 %    PLATELET 587 (L) 947 - 400 K/uL    MPV 11.3 8.9 - 12.9 FL    NEUTROPHILS 92 (H) 32 - 75 %    LYMPHOCYTES 5 (L) 12 - 49 %    MONOCYTES 4 (L) 5 - 13 %    EOSINOPHILS 0 0 - 7 %    BASOPHILS 0 0 - 1 %    IMMATURE GRANULOCYTES 2 (H) 0.0 - 0.5 %    ABS. NEUTROPHILS 26.1 (H) 1.8 - 8.0 K/UL    ABS. LYMPHOCYTES 1.3 0.8 - 3.5 K/UL    ABS. MONOCYTES 1.1 (H) 0.0 - 1.0 K/UL    ABS. EOSINOPHILS 0.0 0.0 - 0.4 K/UL    ABS. BASOPHILS 0.0 0.0 - 0.1 K/UL    ABS. IMM. GRANS. 0.5 (H) 0.00 - 0.04 K/UL    DF AUTOMATED     METABOLIC PANEL, COMPREHENSIVE    Collection Time: 11/12/20 10:00 PM   Result Value Ref Range    Sodium 131 (L) 136 - 145 mmol/L    Potassium 3.4 (L) 3.5 - 5.1 mmol/L    Chloride 95 (L) 97 - 108 mmol/L    CO2 23 21 - 32 mmol/L    Anion gap 13 5 - 15 mmol/L    Glucose 338 (H) 65 - 100 mg/dL    BUN 22 (H) 6 - 20 mg/dL    Creatinine 1.48 (H) 0.70 - 1.30 mg/dL    BUN/Creatinine ratio 15 12 - 20      GFR est AA >60 >60 ml/min/1.73m2    GFR est non-AA 51 (L) >60 ml/min/1.73m2    Calcium 8.7 8.5 - 10.1 mg/dL    Bilirubin, total 0.5 0.2 - 1.0 mg/dL    AST (SGOT) 28 15 - 37 U/L    ALT (SGPT) 25 12 - 78 U/L    Alk.  phosphatase 111 45 - 117 U/L    Protein, total 7.6 6.4 - 8.2 g/dL    Albumin 2.7 (L) 3.5 - 5.0 g/dL    Globulin 4.9 (H) 2.0 - 4.0 g/dL    A-G Ratio 0.6 (L) 1.1 - 2.2     TROPONIN I    Collection Time: 11/12/20 10:00 PM   Result Value Ref Range    Troponin-I, Qt. <0.05 <0.05 ng/mL   CULTURE, BLOOD, PAIRED    Collection Time: 11/12/20 10:00 PM    Specimen: Blood   Result Value Ref Range    Special Requests: No Special Requests      Culture result: No growth after 11 hours     LACTIC ACID    Collection Time: 11/12/20 10:00 PM   Result Value Ref Range    Lactic acid 6.3 (HH) 0.4 - 2.0 mmol/L   LACTIC ACID    Collection Time: 11/13/20  2:00 AM   Result Value Ref Range    Lactic acid 2.8 (HH) 0.4 - 2.0 mmol/L   GLUCOSE, POC    Collection Time: 11/13/20 11:48 AM   Result Value Ref Range    Glucose (POC) 368 (H) 65 - 100 mg/dL    Performed by Kimmie Nguyen        XR Results (maximum last 3): Results from [place] encounter on 11/12/20   XR CHEST SNGL V    Addendum Addendum: Addendum/ impression:  Infrahilar disease on the right corresponds to mass seen on CT exam  reported separately. Please see that report for further assessment pulmonary  findings. Olman Isbell MD 11/12/2020 11:12 PM          Narrative XR CHEST SNGL V    11/12/2020 10:41 PM ; SRM     Indication: 39years old; Male ; cp recent fluid on lungs per pt ;    Technique: Single frontal radiograph view of the chest.    Comparison: 11/12/2020    Findings:    Perihilar airspace disease on the right. Patchy and perihilar airspace disease  on the left. Mild hypoinflation. No large pneumothorax, pneumoperitoneum, or  significant pleural fluid. Cardiac silhouette top normal in size for technique. Tracheal shadow unremarkable. There are age-appropriate osseous degenerative findings. Impression Impression:    Patchy bilateral airspace disease suspicious for evolving pneumonia with  differential considerations including atypical viral pneumonia in an appropriate  clinical setting. CT Results (maximum last 3): Results from Hospital Encounter encounter on 11/12/20   CT CHEST WO CONT    Narrative CT CHEST WO CONT    11/12/2020 10:25 PM ; SRM     Indication: 39years old; Male ; sob, CHEST TIGHTNESS, covid+ ; covid +    Technique: Axial CT imaging of the chest was performed according to standard  protocol. Multiplanar reconstructions are provided. One or more of the following  dose reduction techniques were used to: Automatic exposure control, adjustment  of the mA and/or kV according to patient size, use of the aortic reconstruction  techniques.     Intravenous contrast: None    Dose reduction: All CT scans at this facility are performed using dose reduction  optimization techniques as medical/[employer] student who participated in the service and in the documentation of the note.  I have verified the history and personally performed the physical exam and medical decision making.  I agree with the assessment and plan of care as documented in the note.        Kasia Tan MD  Date of Service (when I saw the patient): 03/21/23    (30 minutes was spent on the date of the encounter doing chart review, review of outside records, review and interpretation of pertinent test results, history and exam, documentation, patient counseling, and further activities as noted above.)       appropriate to the performed exam including the  following: Automatic exposure control; adjustment of the mA and/or kV according  to patient size; or the use of reconstruction techniques. Comparison: September 22, 2018 chest x-ray;    Findings:    Lungs: Patchy bilateral upper lobe airspace disease with air bronchograms. More  confluent consolidation at the left lung base. There are some consolidated  regions at the right lung base as well along the margins of a more masslike  abnormality described below. Large airways: The visualized trachea and central bronchial tree are patent. No  endobronchial lesions are evident. Pleura: Unremarkable. Vessels: Atherosclerotic calcification of the aorta. Remaining vessels otherwise  unremarkable noncontrast assessment. Heart: Coronary atherosclerosis. The heart is normal in size and contour. No  pericardial effusion. Mediastinum and siddharth: The visualized thyroid is unremarkable. Pathologic  paratracheal and hilar adenopathy on the right. There is a large masslike  abnormality which may be contiguous with the lower thoracic esophagus measuring  approximately 7.5 cm x 7.5 cm in cross-section and 8.5 cm cranial-caudal. There  is abnormal thickening of the esophageal wall in a fairly diffuse fashion from  just below the thoracic inlet through the gastroesophageal junction. Chest wall/soft tissues: Unremarkable. Upper abdomen: Pancreatic atrophy. Otherwise unremarkable. Bones: Multilevel thoracic degenerative findings. No erosive or destructive  changes. Lines and devices: There are no indwelling lines or devices identified. Impression Impression:    1. 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 Follow-up  contrast enhanced CT of the chest recommended. 2.  Patchy bilateral airspace disease in both lungs consistent with reported  history of atypical viral infection/COVID pneumonia.   3.  Diffusely thickened esophageal wall of uncertain etiology and significance. 4.  Coronary atherosclerosis. 5.  Additional findings as above. MRI Results (maximum last 3): No results found for this or any previous visit. Nuclear Medicine Results (maximum last 3): No results found for this or any previous visit. US Results (maximum last 3): No results found for this or any previous visit. DEXA Results (maximum last 3): No results found for this or any previous visit. DAMIR Results (maximum last 3): No results found for this or any previous visit. IR Results (maximum last 3): No results found for this or any previous visit. VAS/US Results (maximum last 3): No results found for this or any previous visit. PET Results (maximum last 3): No results found for this or any previous visit. Assessment:     Hospital Problems  Never Reviewed          Codes Class Noted POA    Pneumonia due to COVID-19 virus ICD-10-CM: U07.1, J12.89  ICD-9-CM: 480.8  11/13/2020 Unknown        [facility]epsis ([facility] ICD-10-CM: A41.9  ICD-9-CM: 038.9, 995.91  11/13/2020 Unknown          Possible aspiration pneumonia from hiatal hernia  Acute pneumonitis  Acute hypoxic respiratory failure on oxygen patient has been started on remdesivir  Hiatal hernia  Sepsis  Critical care time of 65 minutes    Plan:     Patient has been placed on IV steroids and IV antibiotics.   Pulmonary consult is obtained reviewed    Signed By: Denise Zuleta MD     November 13, 2020

## 2023-05-14 RX ORDER — INSULIN LISPRO 100 [IU]/ML
10 INJECTION, SOLUTION INTRAVENOUS; SUBCUTANEOUS 3 TIMES DAILY
COMMUNITY
Start: 2020-11-20

## 2023-05-14 RX ORDER — INSULIN GLARGINE 100 [IU]/ML
50 INJECTION, SOLUTION SUBCUTANEOUS
COMMUNITY
Start: 2020-11-20

## 2023-05-14 RX ORDER — CHOLECALCIFEROL (VITAMIN D3) 125 MCG
5 CAPSULE ORAL
COMMUNITY
Start: 2020-11-20

## 2023-05-14 RX ORDER — BISACODYL 5 MG/1
5 TABLET, DELAYED RELEASE ORAL DAILY
COMMUNITY
Start: 2020-11-20

## 2023-05-14 RX ORDER — ALBUTEROL SULFATE 90 UG/1
2 AEROSOL, METERED RESPIRATORY (INHALATION) 4 TIMES DAILY PRN
COMMUNITY
Start: 2020-11-11

## 2023-09-22 NOTE — PROGRESS NOTES
BSR given to JEMMA Rangel RN. Subjective:      Patient ID: Odilon Del Rio is a 52 y.o. male.    Chief Complaint: Hypertension    Patient is known to me, being seen today for HTN.     HTN- amlodipine 10mg, atenolol 50mg   BP at home 130/70-80s   Admits tired, just got off cruise, poor diet habits while on vacation     UTD on labs     Last visit August 2023 with PCP, BP elevated at that time.      Review of Systems   Constitutional:  Negative for chills, diaphoresis and fever.   HENT:  Negative for congestion, rhinorrhea and sore throat.    Respiratory:  Negative for cough, shortness of breath and wheezing.    Cardiovascular:  Positive for leg swelling (L ankle/leg, intermittent, improves w rest). Negative for chest pain.   Gastrointestinal:  Negative for abdominal pain, constipation, diarrhea, nausea and vomiting.   Skin:  Negative for rash.   Neurological:  Negative for dizziness, light-headedness and headaches.       Objective:   BP (!) 136/96   Pulse 71   Temp 98.2 °F (36.8 °C) (Tympanic)   Wt (!) 155.7 kg (343 lb 5.9 oz)   SpO2 99%   BMI 45.30 kg/m²   Physical Exam  Constitutional:       General: He is not in acute distress.     Appearance: Normal appearance. He is well-developed. He is not ill-appearing.   HENT:      Head: Normocephalic and atraumatic.   Cardiovascular:      Rate and Rhythm: Normal rate and regular rhythm.      Heart sounds: Normal heart sounds. No murmur heard.  Pulmonary:      Effort: Pulmonary effort is normal. No respiratory distress.      Breath sounds: Normal breath sounds. No decreased breath sounds.   Abdominal:      General: Bowel sounds are normal.      Palpations: Abdomen is soft.      Tenderness: There is no abdominal tenderness.   Musculoskeletal:      Right lower leg: No edema.      Left lower leg: Swelling present. No edema.      Right ankle: Swelling present.      Left ankle: Swelling present.   Skin:     General: Skin is warm and dry.      Findings: No rash.   Psychiatric:         Speech: Speech  normal.         Behavior: Behavior normal.         Thought Content: Thought content normal.       Assessment:      1. Essential hypertension       Plan:   Essential hypertension      Not to take cialis while BP elevated     Discussed lifestyle modifications   Monitor BP at home, bring cuff and log to [facility]  2wk f/u, if elevated will need to adjust meds     Discussed worsening signs/symptoms and when to return to clinic or go to ED.   Patient expresses understanding and agrees with treatment plan.

## (undated) DEVICE — THE ENDO CARRY-ON PROCEDURE KIT CONTAINS ALL OF THE SUPPLIES AND INFECTION PREVENTION PRODUCTS NEEDED FOR ENDOSCOPIC PROCEDURES: Brand: ENDO CARRY-ON PROCEDURE KIT

## (undated) DEVICE — LINE SAMP LNG AD ORAL NSL PT O2 TBNG SMRT CAPNOLN H +